# Patient Record
Sex: FEMALE | Race: WHITE | NOT HISPANIC OR LATINO | Employment: OTHER | URBAN - METROPOLITAN AREA
[De-identification: names, ages, dates, MRNs, and addresses within clinical notes are randomized per-mention and may not be internally consistent; named-entity substitution may affect disease eponyms.]

---

## 2017-01-04 ENCOUNTER — LAB REQUISITION (OUTPATIENT)
Dept: LAB | Facility: HOSPITAL | Age: 67
End: 2017-01-04

## 2017-01-04 DIAGNOSIS — Z00.00 ENCOUNTER FOR GENERAL ADULT MEDICAL EXAMINATION WITHOUT ABNORMAL FINDINGS: ICD-10-CM

## 2017-01-04 LAB
BACTERIA UR QL AUTO: ABNORMAL /HPF
BILIRUB UR QL STRIP: NEGATIVE
CLARITY UR: ABNORMAL
COLOR UR: YELLOW
GLUCOSE UR STRIP-MCNC: NEGATIVE MG/DL
HGB UR QL STRIP.AUTO: ABNORMAL
HYALINE CASTS UR QL AUTO: ABNORMAL /LPF
KETONES UR QL STRIP: NEGATIVE
LEUKOCYTE ESTERASE UR QL STRIP.AUTO: ABNORMAL
NITRITE UR QL STRIP: NEGATIVE
PH UR STRIP.AUTO: 5.5 [PH] (ref 5–8)
PROT UR QL STRIP: ABNORMAL
RBC # UR: ABNORMAL /HPF
REF LAB TEST METHOD: ABNORMAL
SP GR UR STRIP: 1.01 (ref 1–1.03)
SQUAMOUS #/AREA URNS HPF: ABNORMAL /HPF
UROBILINOGEN UR QL STRIP: ABNORMAL
WBC UR QL AUTO: ABNORMAL /HPF

## 2017-01-04 PROCEDURE — 87186 SC STD MICRODIL/AGAR DIL: CPT | Performed by: OBSTETRICS & GYNECOLOGY

## 2017-01-04 PROCEDURE — 87077 CULTURE AEROBIC IDENTIFY: CPT | Performed by: OBSTETRICS & GYNECOLOGY

## 2017-01-04 PROCEDURE — 81001 URINALYSIS AUTO W/SCOPE: CPT | Performed by: OBSTETRICS & GYNECOLOGY

## 2017-01-04 PROCEDURE — 87086 URINE CULTURE/COLONY COUNT: CPT | Performed by: OBSTETRICS & GYNECOLOGY

## 2017-01-04 PROCEDURE — 87147 CULTURE TYPE IMMUNOLOGIC: CPT | Performed by: OBSTETRICS & GYNECOLOGY

## 2017-01-07 LAB — BACTERIA SPEC AEROBE CULT: ABNORMAL

## 2018-02-06 ENCOUNTER — TRANSCRIBE ORDERS (OUTPATIENT)
Dept: ADMINISTRATIVE | Facility: HOSPITAL | Age: 68
End: 2018-02-06

## 2018-02-06 DIAGNOSIS — Z12.31 VISIT FOR SCREENING MAMMOGRAM: Primary | ICD-10-CM

## 2018-04-11 ENCOUNTER — HOSPITAL ENCOUNTER (OUTPATIENT)
Dept: MAMMOGRAPHY | Facility: HOSPITAL | Age: 68
Discharge: HOME | End: 2018-04-11
Attending: OBSTETRICS & GYNECOLOGY | Admitting: OBSTETRICS & GYNECOLOGY

## 2018-04-11 DIAGNOSIS — Z12.31 VISIT FOR SCREENING MAMMOGRAM: ICD-10-CM

## 2018-04-11 PROCEDURE — 77063 BREAST TOMOSYNTHESIS BI: CPT

## 2018-04-11 PROCEDURE — 77063 BREAST TOMOSYNTHESIS BI: CPT | Performed by: RADIOLOGY

## 2018-04-11 PROCEDURE — 77067 SCR MAMMO BI INCL CAD: CPT | Performed by: RADIOLOGY

## 2018-04-11 PROCEDURE — 77067 SCR MAMMO BI INCL CAD: CPT

## 2021-01-29 ENCOUNTER — IMMUNIZATION (OUTPATIENT)
Dept: VACCINE CLINIC | Facility: HOSPITAL | Age: 71
End: 2021-01-29

## 2021-01-29 PROCEDURE — 91300 HC SARSCOV02 VAC 30MCG/0.3ML IM: CPT | Performed by: INTERNAL MEDICINE

## 2021-01-29 PROCEDURE — 0001A: CPT | Performed by: INTERNAL MEDICINE

## 2021-02-19 ENCOUNTER — IMMUNIZATION (OUTPATIENT)
Dept: VACCINE CLINIC | Facility: HOSPITAL | Age: 71
End: 2021-02-19

## 2021-02-19 PROCEDURE — 0002A: CPT | Performed by: INTERNAL MEDICINE

## 2021-02-19 PROCEDURE — 91300 HC SARSCOV02 VAC 30MCG/0.3ML IM: CPT | Performed by: INTERNAL MEDICINE

## 2022-10-06 ENCOUNTER — HOSPITAL ENCOUNTER (INPATIENT)
Age: 72
LOS: 7 days | Discharge: HOME | DRG: 871 | End: 2022-10-13
Attending: EMERGENCY MEDICINE | Admitting: HOSPITALIST
Payer: MEDICARE

## 2022-10-06 ENCOUNTER — APPOINTMENT (OUTPATIENT)
Dept: GENERAL RADIOLOGY | Age: 72
DRG: 871 | End: 2022-10-06
Payer: MEDICARE

## 2022-10-06 DIAGNOSIS — E87.5 HYPERKALEMIA: ICD-10-CM

## 2022-10-06 DIAGNOSIS — R65.20 SEVERE SEPSIS (HCC): ICD-10-CM

## 2022-10-06 DIAGNOSIS — J18.9 COMMUNITY ACQUIRED PNEUMONIA OF RIGHT LOWER LOBE OF LUNG: ICD-10-CM

## 2022-10-06 DIAGNOSIS — J96.02 ACUTE RESPIRATORY FAILURE WITH HYPOXIA AND HYPERCAPNIA (HCC): Primary | ICD-10-CM

## 2022-10-06 DIAGNOSIS — A41.9 SEVERE SEPSIS (HCC): ICD-10-CM

## 2022-10-06 DIAGNOSIS — J96.01 ACUTE RESPIRATORY FAILURE WITH HYPOXIA AND HYPERCAPNIA (HCC): Primary | ICD-10-CM

## 2022-10-06 PROBLEM — J96.90 RESPIRATORY FAILURE (HCC): Status: ACTIVE | Noted: 2022-10-06

## 2022-10-06 LAB
ALBUMIN SERPL-MCNC: 3.5 G/DL (ref 3.5–5.2)
ALLENS TEST: ABNORMAL
ALP BLD-CCNC: 96 U/L (ref 35–104)
ALT SERPL-CCNC: 17 U/L (ref 5–33)
ANION GAP SERPL CALCULATED.3IONS-SCNC: 11 MMOL/L (ref 7–19)
AST SERPL-CCNC: 30 U/L (ref 5–32)
BASE EXCESS ARTERIAL: -11.5 MMOL/L (ref -2–2)
BASOPHILS ABSOLUTE: 0 K/UL (ref 0–0.2)
BASOPHILS RELATIVE PERCENT: 0.2 % (ref 0–1)
BILIRUB SERPL-MCNC: 0.4 MG/DL (ref 0.2–1.2)
BUN BLDV-MCNC: 62 MG/DL (ref 8–23)
CALCIUM SERPL-MCNC: 8.8 MG/DL (ref 8.8–10.2)
CARBOXYHEMOGLOBIN ARTERIAL: 1.2 % (ref 0–5)
CHLORIDE BLD-SCNC: 110 MMOL/L (ref 98–111)
CO2: 19 MMOL/L (ref 22–29)
CREAT SERPL-MCNC: 2.5 MG/DL (ref 0.5–0.9)
EOSINOPHILS ABSOLUTE: 0 K/UL (ref 0–0.6)
EOSINOPHILS RELATIVE PERCENT: 0.2 % (ref 0–5)
GFR AFRICAN AMERICAN: 23
GFR NON-AFRICAN AMERICAN: 19
GLUCOSE BLD-MCNC: 172 MG/DL (ref 70–99)
GLUCOSE BLD-MCNC: 216 MG/DL (ref 74–109)
HCO3 ARTERIAL: 16 MMOL/L (ref 22–26)
HCT VFR BLD CALC: 44 % (ref 37–47)
HEMOGLOBIN, ART, EXTENDED: 13.9 G/DL (ref 12–16)
HEMOGLOBIN: 13.8 G/DL (ref 12–16)
IMMATURE GRANULOCYTES #: 0 K/UL
LACTIC ACID: 2.2 MMOL/L (ref 0.5–1.9)
LYMPHOCYTES ABSOLUTE: 0.5 K/UL (ref 1.1–4.5)
LYMPHOCYTES RELATIVE PERCENT: 8.1 % (ref 20–40)
MCH RBC QN AUTO: 32.7 PG (ref 27–31)
MCHC RBC AUTO-ENTMCNC: 31.4 G/DL (ref 33–37)
MCV RBC AUTO: 104.3 FL (ref 81–99)
METHEMOGLOBIN ARTERIAL: 0.8 %
MONOCYTES ABSOLUTE: 0.1 K/UL (ref 0–0.9)
MONOCYTES RELATIVE PERCENT: 1.7 % (ref 0–10)
NEUTROPHILS ABSOLUTE: 6 K/UL (ref 1.5–7.5)
NEUTROPHILS RELATIVE PERCENT: 89.6 % (ref 50–65)
O2 CONTENT ARTERIAL: 15.9 ML/DL
O2 DELIVERY DEVICE: ABNORMAL
O2 SAT, ARTERIAL: 81.4 %
O2 THERAPY: ABNORMAL
OXYGEN FLOW: 5
PCO2 ARTERIAL: 41 MMHG (ref 35–45)
PDW BLD-RTO: 14.8 % (ref 11.5–14.5)
PERFORMED ON: ABNORMAL
PH ARTERIAL: 7.2 (ref 7.35–7.45)
PLATELET # BLD: 160 K/UL (ref 130–400)
PMV BLD AUTO: 9.8 FL (ref 9.4–12.3)
PO2 ARTERIAL: 52 MMHG (ref 80–100)
POTASSIUM SERPL-SCNC: 6.1 MMOL/L (ref 3.5–5)
POTASSIUM, WHOLE BLOOD: 5.5
PRO-BNP: 338 PG/ML (ref 0–900)
RBC # BLD: 4.22 M/UL (ref 4.2–5.4)
SAMPLE SOURCE: ABNORMAL
SARS-COV-2, NAAT: NOT DETECTED
SODIUM BLD-SCNC: 140 MMOL/L (ref 136–145)
TOTAL PROTEIN: 5.9 G/DL (ref 6.6–8.7)
TROPONIN: <0.01 NG/ML (ref 0–0.03)
WBC # BLD: 6.7 K/UL (ref 4.8–10.8)

## 2022-10-06 PROCEDURE — 85025 COMPLETE CBC W/AUTO DIFF WBC: CPT

## 2022-10-06 PROCEDURE — 84145 PROCALCITONIN (PCT): CPT

## 2022-10-06 PROCEDURE — 83735 ASSAY OF MAGNESIUM: CPT

## 2022-10-06 PROCEDURE — 02HV33Z INSERTION OF INFUSION DEVICE INTO SUPERIOR VENA CAVA, PERCUTANEOUS APPROACH: ICD-10-PCS | Performed by: HOSPITALIST

## 2022-10-06 PROCEDURE — 83605 ASSAY OF LACTIC ACID: CPT

## 2022-10-06 PROCEDURE — 94640 AIRWAY INHALATION TREATMENT: CPT

## 2022-10-06 PROCEDURE — 84484 ASSAY OF TROPONIN QUANT: CPT

## 2022-10-06 PROCEDURE — 71045 X-RAY EXAM CHEST 1 VIEW: CPT

## 2022-10-06 PROCEDURE — 36415 COLL VENOUS BLD VENIPUNCTURE: CPT

## 2022-10-06 PROCEDURE — 99285 EMERGENCY DEPT VISIT HI MDM: CPT

## 2022-10-06 PROCEDURE — 80053 COMPREHEN METABOLIC PANEL: CPT

## 2022-10-06 PROCEDURE — 2000000000 HC ICU R&B

## 2022-10-06 PROCEDURE — 93005 ELECTROCARDIOGRAM TRACING: CPT | Performed by: EMERGENCY MEDICINE

## 2022-10-06 PROCEDURE — 6370000000 HC RX 637 (ALT 250 FOR IP): Performed by: EMERGENCY MEDICINE

## 2022-10-06 PROCEDURE — 82306 VITAMIN D 25 HYDROXY: CPT

## 2022-10-06 PROCEDURE — 84443 ASSAY THYROID STIM HORMONE: CPT

## 2022-10-06 PROCEDURE — 83880 ASSAY OF NATRIURETIC PEPTIDE: CPT

## 2022-10-06 PROCEDURE — 2580000003 HC RX 258: Performed by: EMERGENCY MEDICINE

## 2022-10-06 PROCEDURE — 36600 WITHDRAWAL OF ARTERIAL BLOOD: CPT

## 2022-10-06 PROCEDURE — 87635 SARS-COV-2 COVID-19 AMP PRB: CPT

## 2022-10-06 RX ORDER — INSULIN GLARGINE 100 [IU]/ML
16 INJECTION, SOLUTION SUBCUTANEOUS NIGHTLY
COMMUNITY

## 2022-10-06 RX ORDER — DEXTROSE MONOHYDRATE 100 MG/ML
INJECTION, SOLUTION INTRAVENOUS CONTINUOUS PRN
Status: DISCONTINUED | OUTPATIENT
Start: 2022-10-06 | End: 2022-10-13 | Stop reason: HOSPADM

## 2022-10-06 RX ORDER — ACETAMINOPHEN 325 MG/1
650 TABLET ORAL EVERY 6 HOURS PRN
Status: DISCONTINUED | OUTPATIENT
Start: 2022-10-06 | End: 2022-10-13 | Stop reason: HOSPADM

## 2022-10-06 RX ORDER — LEVOFLOXACIN 5 MG/ML
750 INJECTION, SOLUTION INTRAVENOUS ONCE
Status: COMPLETED | OUTPATIENT
Start: 2022-10-06 | End: 2022-10-07

## 2022-10-06 RX ORDER — SODIUM CHLORIDE 0.9 % (FLUSH) 0.9 %
10 SYRINGE (ML) INJECTION PRN
Status: DISCONTINUED | OUTPATIENT
Start: 2022-10-06 | End: 2022-10-13 | Stop reason: HOSPADM

## 2022-10-06 RX ORDER — SODIUM CHLORIDE 9 MG/ML
INJECTION, SOLUTION INTRAVENOUS PRN
Status: DISCONTINUED | OUTPATIENT
Start: 2022-10-06 | End: 2022-10-13 | Stop reason: HOSPADM

## 2022-10-06 RX ORDER — ONDANSETRON 4 MG/1
4 TABLET, ORALLY DISINTEGRATING ORAL EVERY 8 HOURS PRN
Status: DISCONTINUED | OUTPATIENT
Start: 2022-10-06 | End: 2022-10-13 | Stop reason: HOSPADM

## 2022-10-06 RX ORDER — ASPIRIN 81 MG/1
81 TABLET, CHEWABLE ORAL DAILY
COMMUNITY

## 2022-10-06 RX ORDER — ACETAMINOPHEN 650 MG/1
650 SUPPOSITORY RECTAL EVERY 6 HOURS PRN
Status: DISCONTINUED | OUTPATIENT
Start: 2022-10-06 | End: 2022-10-13 | Stop reason: HOSPADM

## 2022-10-06 RX ORDER — SODIUM POLYSTYRENE SULFONATE 15 G/60ML
15 SUSPENSION ORAL; RECTAL ONCE
Status: DISCONTINUED | OUTPATIENT
Start: 2022-10-06 | End: 2022-10-07

## 2022-10-06 RX ORDER — GUAIFENESIN 600 MG/1
600 TABLET, EXTENDED RELEASE ORAL 2 TIMES DAILY
Status: DISCONTINUED | OUTPATIENT
Start: 2022-10-07 | End: 2022-10-13 | Stop reason: HOSPADM

## 2022-10-06 RX ORDER — ATORVASTATIN CALCIUM 20 MG/1
20 TABLET, FILM COATED ORAL DAILY
COMMUNITY

## 2022-10-06 RX ORDER — ONDANSETRON 2 MG/ML
4 INJECTION INTRAMUSCULAR; INTRAVENOUS EVERY 6 HOURS PRN
Status: DISCONTINUED | OUTPATIENT
Start: 2022-10-06 | End: 2022-10-13 | Stop reason: HOSPADM

## 2022-10-06 RX ORDER — INSULIN ASPART 100 [IU]/ML
INJECTION, SOLUTION INTRAVENOUS; SUBCUTANEOUS 3 TIMES DAILY
COMMUNITY

## 2022-10-06 RX ORDER — IPRATROPIUM BROMIDE AND ALBUTEROL SULFATE 2.5; .5 MG/3ML; MG/3ML
1 SOLUTION RESPIRATORY (INHALATION)
Status: DISCONTINUED | OUTPATIENT
Start: 2022-10-07 | End: 2022-10-13 | Stop reason: HOSPADM

## 2022-10-06 RX ORDER — IPRATROPIUM BROMIDE AND ALBUTEROL SULFATE 2.5; .5 MG/3ML; MG/3ML
1 SOLUTION RESPIRATORY (INHALATION) ONCE
Status: COMPLETED | OUTPATIENT
Start: 2022-10-06 | End: 2022-10-06

## 2022-10-06 RX ORDER — POLYETHYLENE GLYCOL 3350 17 G/17G
17 POWDER, FOR SOLUTION ORAL DAILY PRN
Status: DISCONTINUED | OUTPATIENT
Start: 2022-10-06 | End: 2022-10-13 | Stop reason: HOSPADM

## 2022-10-06 RX ORDER — SODIUM CHLORIDE 0.9 % (FLUSH) 0.9 %
5-40 SYRINGE (ML) INJECTION EVERY 12 HOURS SCHEDULED
Status: DISCONTINUED | OUTPATIENT
Start: 2022-10-06 | End: 2022-10-13 | Stop reason: HOSPADM

## 2022-10-06 RX ORDER — ACETYLCYSTEINE 200 MG/ML
600 SOLUTION ORAL; RESPIRATORY (INHALATION) 2 TIMES DAILY
Status: DISCONTINUED | OUTPATIENT
Start: 2022-10-07 | End: 2022-10-13 | Stop reason: HOSPADM

## 2022-10-06 RX ORDER — ENOXAPARIN SODIUM 100 MG/ML
30 INJECTION SUBCUTANEOUS DAILY
Status: DISCONTINUED | OUTPATIENT
Start: 2022-10-07 | End: 2022-10-13 | Stop reason: HOSPADM

## 2022-10-06 RX ORDER — SODIUM CHLORIDE, SODIUM LACTATE, POTASSIUM CHLORIDE, AND CALCIUM CHLORIDE .6; .31; .03; .02 G/100ML; G/100ML; G/100ML; G/100ML
30 INJECTION, SOLUTION INTRAVENOUS ONCE
Status: COMPLETED | OUTPATIENT
Start: 2022-10-06 | End: 2022-10-07

## 2022-10-06 RX ADMIN — IPRATROPIUM BROMIDE AND ALBUTEROL SULFATE 1 AMPULE: 2.5; .5 SOLUTION RESPIRATORY (INHALATION) at 23:16

## 2022-10-06 RX ADMIN — SODIUM CHLORIDE, POTASSIUM CHLORIDE, SODIUM LACTATE AND CALCIUM CHLORIDE 1000 ML: 600; 310; 30; 20 INJECTION, SOLUTION INTRAVENOUS at 23:49

## 2022-10-07 ENCOUNTER — APPOINTMENT (OUTPATIENT)
Dept: GENERAL RADIOLOGY | Age: 72
DRG: 871 | End: 2022-10-07
Payer: MEDICARE

## 2022-10-07 ENCOUNTER — APPOINTMENT (OUTPATIENT)
Dept: NUCLEAR MEDICINE | Age: 72
DRG: 871 | End: 2022-10-07
Payer: MEDICARE

## 2022-10-07 LAB
ADENOVIRUS BY PCR: NOT DETECTED
ALLENS TEST: ABNORMAL
ANION GAP SERPL CALCULATED.3IONS-SCNC: 13 MMOL/L (ref 7–19)
BACTERIA: ABNORMAL /HPF
BASE EXCESS ARTERIAL: -10 MMOL/L (ref -2–2)
BILIRUBIN URINE: NEGATIVE
BLOOD, URINE: ABNORMAL
BORDETELLA PARAPERTUSSIS BY PCR: NOT DETECTED
BORDETELLA PERTUSSIS BY PCR: NOT DETECTED
BUN BLDV-MCNC: 62 MG/DL (ref 8–23)
CALCIUM SERPL-MCNC: 8.1 MG/DL (ref 8.8–10.2)
CARBOXYHEMOGLOBIN ARTERIAL: 1.5 % (ref 0–5)
CHLAMYDOPHILIA PNEUMONIAE BY PCR: NOT DETECTED
CHLORIDE BLD-SCNC: 111 MMOL/L (ref 98–111)
CLARITY: ABNORMAL
CO2: 16 MMOL/L (ref 22–29)
COLOR: YELLOW
CORONAVIRUS 229E BY PCR: NOT DETECTED
CORONAVIRUS HKU1 BY PCR: NOT DETECTED
CORONAVIRUS NL63 BY PCR: NOT DETECTED
CORONAVIRUS OC43 BY PCR: NOT DETECTED
CREAT SERPL-MCNC: 2.7 MG/DL (ref 0.5–0.9)
CRYSTALS, UA: ABNORMAL /HPF
D DIMER: 2.36 UG/ML FEU (ref 0–0.48)
EKG P AXIS: 69 DEGREES
EKG P-R INTERVAL: 126 MS
EKG Q-T INTERVAL: 336 MS
EKG QRS DURATION: 82 MS
EKG QTC CALCULATION (BAZETT): 416 MS
EKG T AXIS: 71 DEGREES
EPITHELIAL CELLS, UA: 2 /HPF (ref 0–5)
FERRITIN: 90.3 NG/ML (ref 13–150)
FOLATE: >20 NG/ML (ref 4.8–37.3)
GFR AFRICAN AMERICAN: 21
GFR NON-AFRICAN AMERICAN: 17
GLUCOSE BLD-MCNC: 199 MG/DL (ref 74–109)
GLUCOSE BLD-MCNC: 236 MG/DL (ref 70–99)
GLUCOSE BLD-MCNC: 288 MG/DL (ref 70–99)
GLUCOSE BLD-MCNC: 301 MG/DL (ref 70–99)
GLUCOSE BLD-MCNC: 311 MG/DL (ref 70–99)
GLUCOSE BLD-MCNC: 314 MG/DL (ref 70–99)
GLUCOSE URINE: NEGATIVE MG/DL
HCO3 ARTERIAL: 15.3 MMOL/L (ref 22–26)
HCT VFR BLD CALC: 33.7 % (ref 37–47)
HEMOGLOBIN, ART, EXTENDED: 10.4 G/DL (ref 12–16)
HEMOGLOBIN: 10.9 G/DL (ref 12–16)
HUMAN METAPNEUMOVIRUS BY PCR: NOT DETECTED
HUMAN RHINOVIRUS/ENTEROVIRUS BY PCR: NOT DETECTED
HYALINE CASTS: 6 /HPF (ref 0–8)
INFLUENZA A BY PCR: NOT DETECTED
INFLUENZA B BY PCR: NOT DETECTED
IRON SATURATION: 35 % (ref 14–50)
IRON: 79 UG/DL (ref 37–145)
KETONES, URINE: NEGATIVE MG/DL
LACTIC ACID: 1.2 MMOL/L (ref 0.5–1.9)
LACTIC ACID: 2.7 MMOL/L (ref 0.5–1.9)
LEUKOCYTE ESTERASE, URINE: ABNORMAL
LV EF: 73 %
LVEF MODALITY: NORMAL
MAGNESIUM: 2 MG/DL (ref 1.6–2.4)
MCH RBC QN AUTO: 33.5 PG (ref 27–31)
MCHC RBC AUTO-ENTMCNC: 32.3 G/DL (ref 33–37)
MCV RBC AUTO: 103.7 FL (ref 81–99)
METHEMOGLOBIN ARTERIAL: 0.7 %
MYCOPLASMA PNEUMONIAE BY PCR: NOT DETECTED
NITRITE, URINE: NEGATIVE
O2 CONTENT ARTERIAL: 13.3 ML/DL
O2 DELIVERY DEVICE: ABNORMAL
O2 SAT, ARTERIAL: 90.5 %
O2 THERAPY: ABNORMAL
OXYGEN FLOW: 7
PARAINFLUENZA VIRUS 1 BY PCR: NOT DETECTED
PARAINFLUENZA VIRUS 2 BY PCR: NOT DETECTED
PARAINFLUENZA VIRUS 3 BY PCR: NOT DETECTED
PARAINFLUENZA VIRUS 4 BY PCR: NOT DETECTED
PCO2 ARTERIAL: 31 MMHG (ref 35–45)
PDW BLD-RTO: 14.7 % (ref 11.5–14.5)
PERFORMED ON: ABNORMAL
PH ARTERIAL: 7.3 (ref 7.35–7.45)
PH UA: 8 (ref 5–8)
PLATELET # BLD: 126 K/UL (ref 130–400)
PMV BLD AUTO: 10.4 FL (ref 9.4–12.3)
PO2 ARTERIAL: 58 MMHG (ref 80–100)
POTASSIUM SERPL-SCNC: 5.1 MMOL/L (ref 3.5–5)
POTASSIUM, WHOLE BLOOD: 5.7
PROCALCITONIN: 0.48 NG/ML (ref 0–0.09)
PROTEIN UA: 100 MG/DL
RBC # BLD: 3.25 M/UL (ref 4.2–5.4)
RBC UA: 11 /HPF (ref 0–4)
RESPIRATORY SYNCYTIAL VIRUS BY PCR: NOT DETECTED
SAMPLE SOURCE: ABNORMAL
SARS-COV-2, PCR: NOT DETECTED
SODIUM BLD-SCNC: 140 MMOL/L (ref 136–145)
SPECIFIC GRAVITY UA: 1.01 (ref 1–1.03)
TOTAL IRON BINDING CAPACITY: 226 UG/DL (ref 250–400)
TSH SERPL DL<=0.05 MIU/L-ACNC: 1.84 UIU/ML (ref 0.27–4.2)
UROBILINOGEN, URINE: 0.2 E.U./DL
VITAMIN B-12: 1234 PG/ML (ref 211–946)
VITAMIN D 25-HYDROXY: 51.4 NG/ML
WBC # BLD: 10.1 K/UL (ref 4.8–10.8)
WBC UA: >900 /HPF (ref 0–5)

## 2022-10-07 PROCEDURE — 6360000002 HC RX W HCPCS: Performed by: HOSPITALIST

## 2022-10-07 PROCEDURE — 81001 URINALYSIS AUTO W/SCOPE: CPT

## 2022-10-07 PROCEDURE — 87186 SC STD MICRODIL/AGAR DIL: CPT

## 2022-10-07 PROCEDURE — 87150 DNA/RNA AMPLIFIED PROBE: CPT

## 2022-10-07 PROCEDURE — 82803 BLOOD GASES ANY COMBINATION: CPT

## 2022-10-07 PROCEDURE — C8929 TTE W OR WO FOL WCON,DOPPLER: HCPCS

## 2022-10-07 PROCEDURE — 3430000000 HC RX DIAGNOSTIC RADIOPHARMACEUTICAL: Performed by: HOSPITALIST

## 2022-10-07 PROCEDURE — 6360000004 HC RX CONTRAST MEDICATION: Performed by: INTERNAL MEDICINE

## 2022-10-07 PROCEDURE — 2580000003 HC RX 258: Performed by: HOSPITALIST

## 2022-10-07 PROCEDURE — 94640 AIRWAY INHALATION TREATMENT: CPT

## 2022-10-07 PROCEDURE — 36415 COLL VENOUS BLD VENIPUNCTURE: CPT

## 2022-10-07 PROCEDURE — 93010 ELECTROCARDIOGRAM REPORT: CPT | Performed by: INTERNAL MEDICINE

## 2022-10-07 PROCEDURE — 36556 INSERT NON-TUNNEL CV CATH: CPT

## 2022-10-07 PROCEDURE — A9540 TC99M MAA: HCPCS | Performed by: HOSPITALIST

## 2022-10-07 PROCEDURE — 87040 BLOOD CULTURE FOR BACTERIA: CPT

## 2022-10-07 PROCEDURE — 82746 ASSAY OF FOLIC ACID SERUM: CPT

## 2022-10-07 PROCEDURE — 2700000000 HC OXYGEN THERAPY PER DAY

## 2022-10-07 PROCEDURE — 83550 IRON BINDING TEST: CPT

## 2022-10-07 PROCEDURE — 80048 BASIC METABOLIC PNL TOTAL CA: CPT

## 2022-10-07 PROCEDURE — 2000000000 HC ICU R&B

## 2022-10-07 PROCEDURE — 78580 LUNG PERFUSION IMAGING: CPT

## 2022-10-07 PROCEDURE — 71045 X-RAY EXAM CHEST 1 VIEW: CPT

## 2022-10-07 PROCEDURE — 6360000002 HC RX W HCPCS: Performed by: EMERGENCY MEDICINE

## 2022-10-07 PROCEDURE — 0202U NFCT DS 22 TRGT SARS-COV-2: CPT

## 2022-10-07 PROCEDURE — 2500000003 HC RX 250 WO HCPCS: Performed by: HOSPITALIST

## 2022-10-07 PROCEDURE — 6370000000 HC RX 637 (ALT 250 FOR IP): Performed by: EMERGENCY MEDICINE

## 2022-10-07 PROCEDURE — 2580000003 HC RX 258: Performed by: EMERGENCY MEDICINE

## 2022-10-07 PROCEDURE — 51702 INSERT TEMP BLADDER CATH: CPT

## 2022-10-07 PROCEDURE — 92610 EVALUATE SWALLOWING FUNCTION: CPT

## 2022-10-07 PROCEDURE — 85027 COMPLETE CBC AUTOMATED: CPT

## 2022-10-07 PROCEDURE — 92522 EVALUATE SPEECH PRODUCTION: CPT

## 2022-10-07 PROCEDURE — 85379 FIBRIN DEGRADATION QUANT: CPT

## 2022-10-07 PROCEDURE — 6370000000 HC RX 637 (ALT 250 FOR IP): Performed by: HOSPITALIST

## 2022-10-07 PROCEDURE — 83605 ASSAY OF LACTIC ACID: CPT

## 2022-10-07 PROCEDURE — 6360000002 HC RX W HCPCS: Performed by: INTERNAL MEDICINE

## 2022-10-07 PROCEDURE — 2580000003 HC RX 258: Performed by: INTERNAL MEDICINE

## 2022-10-07 PROCEDURE — 87086 URINE CULTURE/COLONY COUNT: CPT

## 2022-10-07 PROCEDURE — 82607 VITAMIN B-12: CPT

## 2022-10-07 PROCEDURE — 83540 ASSAY OF IRON: CPT

## 2022-10-07 PROCEDURE — 82728 ASSAY OF FERRITIN: CPT

## 2022-10-07 PROCEDURE — 74018 RADEX ABDOMEN 1 VIEW: CPT

## 2022-10-07 PROCEDURE — 82947 ASSAY GLUCOSE BLOOD QUANT: CPT

## 2022-10-07 RX ORDER — SODIUM BICARBONATE 650 MG/1
650 TABLET ORAL 4 TIMES DAILY
Status: DISCONTINUED | OUTPATIENT
Start: 2022-10-07 | End: 2022-10-07

## 2022-10-07 RX ORDER — SODIUM CHLORIDE 9 MG/ML
INJECTION, SOLUTION INTRAVENOUS CONTINUOUS
Status: DISCONTINUED | OUTPATIENT
Start: 2022-10-07 | End: 2022-10-07

## 2022-10-07 RX ORDER — INSULIN LISPRO 100 [IU]/ML
0-8 INJECTION, SOLUTION INTRAVENOUS; SUBCUTANEOUS
Status: DISCONTINUED | OUTPATIENT
Start: 2022-10-07 | End: 2022-10-13 | Stop reason: HOSPADM

## 2022-10-07 RX ORDER — INSULIN LISPRO 100 [IU]/ML
0-4 INJECTION, SOLUTION INTRAVENOUS; SUBCUTANEOUS NIGHTLY
Status: DISCONTINUED | OUTPATIENT
Start: 2022-10-07 | End: 2022-10-13 | Stop reason: HOSPADM

## 2022-10-07 RX ORDER — POLYETHYLENE GLYCOL 3350 17 G/17G
17 POWDER, FOR SOLUTION ORAL DAILY
Status: DISCONTINUED | OUTPATIENT
Start: 2022-10-07 | End: 2022-10-13 | Stop reason: HOSPADM

## 2022-10-07 RX ORDER — NOREPINEPHRINE BIT/0.9 % NACL 16MG/250ML
1-100 INFUSION BOTTLE (ML) INTRAVENOUS CONTINUOUS
Status: DISCONTINUED | OUTPATIENT
Start: 2022-10-07 | End: 2022-10-09

## 2022-10-07 RX ORDER — SENNA AND DOCUSATE SODIUM 50; 8.6 MG/1; MG/1
2 TABLET, FILM COATED ORAL DAILY
Status: DISCONTINUED | OUTPATIENT
Start: 2022-10-07 | End: 2022-10-13 | Stop reason: HOSPADM

## 2022-10-07 RX ORDER — LEVOFLOXACIN 5 MG/ML
750 INJECTION, SOLUTION INTRAVENOUS
Status: DISCONTINUED | OUTPATIENT
Start: 2022-10-09 | End: 2022-10-07

## 2022-10-07 RX ORDER — INSULIN GLARGINE 100 [IU]/ML
10 INJECTION, SOLUTION SUBCUTANEOUS 2 TIMES DAILY
Status: DISCONTINUED | OUTPATIENT
Start: 2022-10-07 | End: 2022-10-13 | Stop reason: HOSPADM

## 2022-10-07 RX ORDER — 0.9 % SODIUM CHLORIDE 0.9 %
30 INTRAVENOUS SOLUTION INTRAVENOUS ONCE
Status: COMPLETED | OUTPATIENT
Start: 2022-10-07 | End: 2022-10-07

## 2022-10-07 RX ORDER — SODIUM CHLORIDE 9 MG/ML
INJECTION, SOLUTION INTRAVENOUS CONTINUOUS
Status: DISCONTINUED | OUTPATIENT
Start: 2022-10-07 | End: 2022-10-10

## 2022-10-07 RX ADMIN — Medication 5 MCG/MIN: at 07:40

## 2022-10-07 RX ADMIN — INSULIN HUMAN 10 UNITS: 100 INJECTION, SOLUTION PARENTERAL at 01:48

## 2022-10-07 RX ADMIN — SODIUM CHLORIDE, PRESERVATIVE FREE 10 ML: 5 INJECTION INTRAVENOUS at 20:37

## 2022-10-07 RX ADMIN — IPRATROPIUM BROMIDE AND ALBUTEROL SULFATE 1 AMPULE: 2.5; .5 SOLUTION RESPIRATORY (INHALATION) at 06:37

## 2022-10-07 RX ADMIN — SODIUM CHLORIDE 2109 ML: 9 INJECTION, SOLUTION INTRAVENOUS at 03:33

## 2022-10-07 RX ADMIN — AZITHROMYCIN DIHYDRATE 500 MG: 500 INJECTION, POWDER, LYOPHILIZED, FOR SOLUTION INTRAVENOUS at 13:39

## 2022-10-07 RX ADMIN — Medication 4 MILLICURIE: at 12:15

## 2022-10-07 RX ADMIN — GUAIFENESIN 600 MG: 600 TABLET ORAL at 09:23

## 2022-10-07 RX ADMIN — GUAIFENESIN 600 MG: 600 TABLET ORAL at 03:25

## 2022-10-07 RX ADMIN — ENOXAPARIN SODIUM 30 MG: 100 INJECTION SUBCUTANEOUS at 09:23

## 2022-10-07 RX ADMIN — IPRATROPIUM BROMIDE AND ALBUTEROL SULFATE 1 AMPULE: 2.5; .5 SOLUTION RESPIRATORY (INHALATION) at 14:47

## 2022-10-07 RX ADMIN — SODIUM CHLORIDE: 9 INJECTION, SOLUTION INTRAVENOUS at 23:29

## 2022-10-07 RX ADMIN — SODIUM BICARBONATE 650 MG: 650 TABLET ORAL at 09:23

## 2022-10-07 RX ADMIN — LEVOFLOXACIN 750 MG: 5 INJECTION, SOLUTION INTRAVENOUS at 01:47

## 2022-10-07 RX ADMIN — IPRATROPIUM BROMIDE AND ALBUTEROL SULFATE 1 AMPULE: 2.5; .5 SOLUTION RESPIRATORY (INHALATION) at 10:32

## 2022-10-07 RX ADMIN — VANCOMYCIN HYDROCHLORIDE 1500 MG: 10 INJECTION, POWDER, LYOPHILIZED, FOR SOLUTION INTRAVENOUS at 06:27

## 2022-10-07 RX ADMIN — GUAIFENESIN 600 MG: 600 TABLET ORAL at 20:36

## 2022-10-07 RX ADMIN — PERFLUTREN 1.5 ML: 6.52 INJECTION, SUSPENSION INTRAVENOUS at 08:08

## 2022-10-07 RX ADMIN — SODIUM CHLORIDE: 9 INJECTION, SOLUTION INTRAVENOUS at 12:18

## 2022-10-07 RX ADMIN — SODIUM CHLORIDE: 9 INJECTION, SOLUTION INTRAVENOUS at 05:57

## 2022-10-07 RX ADMIN — INSULIN LISPRO 6 UNITS: 100 INJECTION, SOLUTION INTRAVENOUS; SUBCUTANEOUS at 17:43

## 2022-10-07 RX ADMIN — WATER 1000 MG: 1 INJECTION INTRAMUSCULAR; INTRAVENOUS; SUBCUTANEOUS at 13:39

## 2022-10-07 RX ADMIN — DEXTROSE MONOHYDRATE 250 ML: 10 INJECTION, SOLUTION INTRAVENOUS at 01:44

## 2022-10-07 RX ADMIN — INSULIN GLARGINE 10 UNITS: 100 INJECTION, SOLUTION SUBCUTANEOUS at 20:36

## 2022-10-07 RX ADMIN — INSULIN LISPRO 6 UNITS: 100 INJECTION, SOLUTION INTRAVENOUS; SUBCUTANEOUS at 13:45

## 2022-10-07 RX ADMIN — IPRATROPIUM BROMIDE AND ALBUTEROL SULFATE 1 AMPULE: 2.5; .5 SOLUTION RESPIRATORY (INHALATION) at 18:39

## 2022-10-07 ASSESSMENT — ENCOUNTER SYMPTOMS
NAUSEA: 1
ABDOMINAL PAIN: 0
RHINORRHEA: 0
SHORTNESS OF BREATH: 1
DIARRHEA: 0
SINUS PRESSURE: 0
VOMITING: 1
SORE THROAT: 0

## 2022-10-07 ASSESSMENT — PAIN SCALES - GENERAL
PAINLEVEL_OUTOF10: 0

## 2022-10-07 NOTE — PROGRESS NOTES
[facility] Pharmacokinetic Monitoring Service - Vancomycin     Jovita To is a 70 y.o. female starting on vancomycin therapy for Sepsis/Uti. Pharmacy consulted by Dr Kelly Monet for monitoring and adjustment. Target Concentration: Dosing based on anticipated concentration <15 mg/L due to renal impairment/insufficiency    Additional Antimicrobials: Levaquin    Pertinent Laboratory Values: Wt Readings from Last 1 Encounters:   10/07/22 155 lb 12.8 oz (70.7 kg)     Temp Readings from Last 1 Encounters:   10/07/22 98.9 °F (37.2 °C) (Temporal)     Estimated Creatinine Clearance: 18 mL/min (A) (based on SCr of 2.7 mg/dL (H)). Recent Labs     10/06/22  2255 10/07/22  0408   CREATININE 2.5* 2.7*   WBC 6.7 10.1     Procalcitonin: 10/06= 0.48    Pertinent Cultures:  No current cultures at this time  Culture Date Source Results        MRSA Nasal Swab: N/A. Non-respiratory infection.     Plan:  Concentration-guided dosing due to renal impairment/insufficiency  Start vancomycin 1500mg x1 dose  Renal labs as indicated   Vancomycin concentration ordered for 10/8 @ 0300   Pharmacy will continue to monitor patient and adjust therapy as indicated    Thank you for the consult,  Pérez Doyle, [address] [facility]  10/7/2022 5:55 AM

## 2022-10-07 NOTE — ED PROVIDER NOTES
[facility] 4 ONCOLOGY UNIT  eMERGENCY dEPARTMENT eNCOUnter      Pt Name: Cy Pimentel  MRN: [id number]  [place] 1950  Date of evaluation: 10/6/2022  Provider: Sami Antony MD    CHIEF COMPLAINT       Chief Complaint   Patient presents with    Shortness of Breath         HISTORY OF PRESENT ILLNESS   (Location/Symptom, Timing/Onset,Context/Setting, Quality, Duration, Modifying Factors, Severity)  Note limiting factors. Cy Pimentel is a 70 y.o. female who presents to the emergency department shortness of breath     HPI    The patient is a 70 male with a history of type 1 diabetes; chronic kidney disease stage IV not yet receiving dialysis; gallstones; hypertension; lactose intolerance; osteopenia; recent admission in April for sepsis and a UTI who presents with increasing shortness of breath nausea and vomiting over the course of 1 day. No documented fever. Comes in tachypneic and in respiratory distress satting in the 80s. Typically require oxygen. Had a bout of hypoglycemia earlier today which preceded her nausea and vomiting and increasing shortness of breath. NursingNotes were reviewed. REVIEW OF SYSTEMS    (2-9 systems for level 4, 10 or more for level 5)     Review of Systems   Constitutional:  Negative for chills, diaphoresis, fatigue and fever. HENT:  Negative for rhinorrhea, sinus pressure and sore throat. Eyes:  Negative for visual disturbance. Respiratory:  Positive for shortness of breath. Cardiovascular:  Negative for chest pain. Gastrointestinal:  Positive for nausea and vomiting. Negative for abdominal pain and diarrhea. Genitourinary:  Negative for difficulty urinating and dysuria. Musculoskeletal:  Negative for arthralgias and myalgias. Skin:  Negative for rash. Neurological:  Negative for weakness. Psychiatric/Behavioral:  Negative for confusion. All other systems reviewed and are negative.          PAST MEDICALHISTORY     Past Medical History:   Diagnosis Date Chronic kidney disease     stage 4 not receiving dialysis    Diabetes mellitus ([facility] [address]     type 1 dx 1963    Gallstones     Hypertension     Lactose intolerance     Osteopenia     UTI (urinary tract infection)     in ICU in April with UTI         SURGICAL HISTORY       Past Surgical History:   Procedure Laterality Date    CARPAL TUNNEL RELEASE Bilateral     CATARACT REMOVAL      FRACTURE SURGERY           CURRENT MEDICATIONS     Current Discharge Medication List        CONTINUE these medications which have NOT CHANGED    Details   insulin glargine (LANTUS) 100 UNIT/ML injection vial Inject 16 Units into the skin nightly      insulin aspart (NOVOLOG) 100 UNIT/ML injection vial Inject into the skin 3 times daily SS before each meal      !! aspirin 81 MG chewable tablet Take 81 mg by mouth daily      atorvastatin (LIPITOR) 20 MG tablet Take 20 mg by mouth daily      !! ASPIRIN 81 PO Take 81 mg by mouth daily       ! ! - Potential duplicate medications found. Please discuss with provider. ALLERGIES     Pcn [penicillins]    FAMILY HISTORY     History reviewed. No pertinent family history.        SOCIAL HISTORY       Social History     Socioeconomic History    Marital status:      Spouse name: None    Number of children: None    Years of education: None    Highest education level: None   Tobacco Use    Smoking status: Never    Smokeless tobacco: Never   Substance and Sexual Activity    Alcohol use: Never    Drug use: Never       SCREENINGS    [place] Coma Scale  Eye Opening: Spontaneous  Best Verbal Response: Oriented  Best Motor Response: Obeys commands  [place] Coma Scale Score: 15        PHYSICAL EXAM    (up to 7 for level 4, 8 or more for level 5)     ED Triage Vitals   BP Temp Temp src Heart Rate Resp SpO2 Height Weight   10/06/22 2248 10/06/22 2248 -- 10/06/22 2248 -- 10/06/22 2247 10/06/22 2248 10/06/22 2248   (!) 130/113 97.6 °F (36.4 °C)  (!) 108  (!) 73 % 5' 4\" (1.626 m) 155 lb (70.3 kg) Physical Exam  Vitals and nursing note reviewed. Constitutional:       Appearance: She is well-developed. HENT:      Head: Normocephalic and atraumatic. Eyes:      General:         Right eye: No discharge. Left eye: No discharge. Conjunctiva/sclera: Conjunctivae normal.      Pupils: Pupils are equal, round, and reactive to light. Cardiovascular:      Rate and Rhythm: Normal rate and regular rhythm. Heart sounds: Normal heart sounds. Pulmonary:      Effort: Pulmonary effort is normal. No respiratory distress. Breath sounds: Examination of the right-lower field reveals rales. Examination of the left-lower field reveals rales. Rales present. No wheezing. Abdominal:      General: Bowel sounds are normal.      Palpations: Abdomen is soft. There is no mass. Tenderness: There is no abdominal tenderness. There is no guarding or rebound. Musculoskeletal:         General: No tenderness. Normal range of motion. Cervical back: Normal range of motion and neck supple. Skin:     General: Skin is warm and dry. Capillary Refill: Capillary refill takes less than 2 seconds. Coloration: Skin is not pale. Neurological:      Mental Status: She is alert and oriented to person, place, and time. Psychiatric:         Behavior: Behavior normal.         Thought Content: Thought content normal.         Judgment: Judgment normal.       DIAGNOSTIC RESULTS     EKG: All EKG's areinterpreted by the Emergency Department Physician who either signs or Co-signs this chart in the absence of a cardiologist.    Sinus tachycardia with a rate of 106 left axis deviation abnormal R wave progression    RADIOLOGY:  Non-plain film images such as CT, Ultrasound and MRI are read by the radiologist. Plain radiographic images are visualized and preliminarily interpreted bythe emergency physician with the below findings:          US RENAL COMPLETE   Final Result   NORMAL RENAL SONOGRAM..    There is suspicion for possible dilated ureter. I would recommend noncontrast CT   of the abdomen and pelvis for further evaluation      XR ABDOMEN (KUB) (SINGLE AP VIEW)   Final Result   Non-specific gas pattern. Fecal stasis suggesting of constipation      XR CHEST PORTABLE   Final Result   Right central line in the upper SVCElectronically signed by Keyona Galo MD on 10-07-22 at 0224      XR CHEST PORTABLE   Final Result   Airspace opacities within the mid to lower lungs likely infectious. Electronically signed by Lisa Lomax MD on 10-06-22 at [address]    (Results Pending)   CT ABDOMEN PELVIS WO CONTRAST Additional Contrast? None    (Results Pending)           LABS:  Labs Reviewed   CULTURE, BLOOD 2 - Abnormal; Notable for the following components:       Result Value    Culture, Blood 2   (*)     Value: Gram stain Anaerobic bottle:    Bottle volume = 7 ml  Gram positive cocci in chains and/or pairs  resembling Streptococcus  Culture in progress  Please notify Physician      Organism Streptococcus alactolyticus (*)     All other components within normal limits    Narrative:     ORDER#: [id number]                          ORDERED BY: Aram Galeas  SOURCE: Blood CVC                          COLLECTED:  10/07/22 01:45  [facility]                      RECEIVED :  10/07/22 92:44  CALL  [facility] tel. ,  Microbiology results called to and read back by Vincent Escobar RN/ICU,  10/07/2022 16:00, by SPENSER   CULTURE, URINE - Abnormal; Notable for the following components:    Urine Culture, Routine   (*)     Value: >100,000 CFU/ml  Mixed skin nick present      Organism Enterococcus faecium (*)     All other components within normal limits    Narrative:     ORDER#: [id number]                          ORDERED BY: Aram Galeas  SOURCE: Urine Clean Catch                  COLLECTED:  10/07/22 00:18  [facility]                      RECEIVED :  10/07/22 01:04   CBC WITH AUTO DIFFERENTIAL - Abnormal; Notable for the following components:    .3 (*)     MCH 32.7 (*)     MCHC 31.4 (*)     RDW 14.8 (*)     Neutrophils % 89.6 (*)     Lymphocytes % 8.1 (*)     Lymphocytes Absolute 0.5 (*)     All other components within normal limits   COMPREHENSIVE METABOLIC PANEL - Abnormal; Notable for the following components:    Potassium 6.1 (*)     CO2 19 (*)     Glucose 216 (*)     BUN 62 (*)     Creatinine 2.5 (*)     GFR Non- 19 (*)     GFR  23 (*)     Total Protein 5.9 (*)     All other components within normal limits    Narrative:     CALL  [facility] tel. ,  Chemistry results called to and read back by LEIGHA Duval in ED, 10/06/2022  23:34, by MATTHEW   LACTIC ACID - Abnormal; Notable for the following components:    Lactic Acid 2.2 (*)     All other components within normal limits    Narrative:     [facility] tel. ,  Chemistry results called to and read back by Edith Ho in ED, 10/06/2022  23:33, by MATTHEW   BLOOD GAS, ARTERIAL - Abnormal; Notable for the following components:    pH, Arterial 7.200 (*)     pO2, Arterial 52.0 (*)     HCO3, Arterial 16.0 (*)     Base Excess, Arterial -11.5 (*)     O2 Sat, Arterial 81.4 (*)     All other components within normal limits    Narrative:     CALL  [facility]  [facility] tel. ,  dr Hero Manzanares, 10/06/2022 23:17, by Eladio Prasad   PROCALCITONIN - Abnormal; Notable for the following components:    Procalcitonin 0.48 (*)     All other components within normal limits   LACTIC ACID - Abnormal; Notable for the following components:    Lactic Acid 2.7 (*)     All other components within normal limits    Narrative:     CALL  [facility] tel. ,  Chemistry results called to and read back by Lin Phillips RN in ICU, 10/07/2022  04:57, by MATTHEW   BASIC METABOLIC PANEL - Abnormal; Notable for the following components:    Potassium 5.1 (*)     CO2 16 (*)     Glucose 199 (*)     BUN 62 (*)     Creatinine 2.7 (*)     GFR Non- 17 (*)     GFR  American 21 (*)     Calcium 8.1 (*)     All other components within normal limits   CBC - Abnormal; Notable for the following components:    RBC 3.25 (*)     Hemoglobin 10.9 (*)     Hematocrit 33.7 (*)     .7 (*)     MCH 33.5 (*)     MCHC 32.3 (*)     RDW 14.7 (*)     Platelets 770 (*)     All other components within normal limits   URINALYSIS WITH REFLEX TO CULTURE - Abnormal; Notable for the following components:    Clarity, UA TURBID (*)     Blood, Urine SMALL (*)     Protein,  (*)     Leukocyte Esterase, Urine LARGE (*)     All other components within normal limits   MICROSCOPIC URINALYSIS - Abnormal; Notable for the following components:    Bacteria, UA 3+ (*)     Crystals, UA NEG (*)     WBC, UA >900 (*)     RBC, UA 11 (*)     All other components within normal limits   D-DIMER, QUANTITATIVE - Abnormal; Notable for the following components:    D-Dimer, Quant 2.36 (*)     All other components within normal limits   IRON AND TIBC - Abnormal; Notable for the following components:    TIBC 226 (*)     All other components within normal limits   VITAMIN B12 - Abnormal; Notable for the following components:    Vitamin B-12 1234 (*)     All other components within normal limits   BLOOD GAS, ARTERIAL - Abnormal; Notable for the following components:    pH, Arterial 7.300 (*)     pCO2, Arterial 31.0 (*)     pO2, Arterial 58.0 (*)     HCO3, Arterial 15.3 (*)     Base Excess, Arterial -10.0 (*)     Hemoglobin, Art, Extended 10.4 (*)     All other components within normal limits   CBC WITH AUTO DIFFERENTIAL - Abnormal; Notable for the following components:    WBC 25.0 (*)     RBC 3.03 (*)     Hemoglobin 10.1 (*)     Hematocrit 31.4 (*)     .6 (*)     MCH 33.3 (*)     MCHC 32.2 (*)     RDW 15.1 (*)     Neutrophils % 90.0 (*)     Lymphocytes % 6.0 (*)     Neutrophils Absolute 23.0 (*)     Toxic Granulation 1+ (*)     Macrocytes 1+ (*)     All other components within normal limits   BASIC METABOLIC PANEL - Abnormal; Notable for the following components:    Sodium 134 (*)     Potassium 6.4 (*)     CO2 14 (*)     Glucose 422 (*)     BUN 63 (*)     Creatinine 2.9 (*)     GFR Non- 16 (*)     GFR  19 (*)     Calcium 8.0 (*)     All other components within normal limits    Narrative:     CALL  [facility] tel. ,  Chemistry results called to and read back by Gayatri Godoy in ICU, 10/08/2022  03:51, by MATTHEW   BASIC METABOLIC PANEL - Abnormal; Notable for the following components:    Chloride 116 (*)     CO2 17 (*)     Glucose 232 (*)     BUN 60 (*)     Creatinine 2.9 (*)     GFR Non- 16 (*)     GFR  19 (*)     Calcium 8.2 (*)     All other components within normal limits   CBC WITH AUTO DIFFERENTIAL - Abnormal; Notable for the following components:    WBC 22.1 (*)     RBC 2.72 (*)     Hemoglobin 9.0 (*)     Hematocrit 27.8 (*)     .2 (*)     MCH 33.1 (*)     MCHC 32.4 (*)     RDW 15.5 (*)     Platelets 830 (*)     Neutrophils % 86.8 (*)     Lymphocytes % 5.3 (*)     Neutrophils Absolute 19.2 (*)     All other components within normal limits   BASIC METABOLIC PANEL - Abnormal; Notable for the following components:    Sodium 149 (*)     Chloride 122 (*)     CO2 17 (*)     Glucose 150 (*)     BUN 46 (*)     Creatinine 2.6 (*)     GFR Non- 18 (*)     GFR  22 (*)     Calcium 8.3 (*)     All other components within normal limits   CBC WITH AUTO DIFFERENTIAL - Abnormal; Notable for the following components:    WBC 17.3 (*)     RBC 2.80 (*)     Hemoglobin 9.3 (*)     Hematocrit 29.1 (*)     .9 (*)     MCH 33.2 (*)     MCHC 32.0 (*)     RDW 15.9 (*)     Platelets 566 (*)     Neutrophils % 86.1 (*)     Lymphocytes % 5.7 (*)     Neutrophils Absolute 14.9 (*)     Lymphocytes Absolute 1.0 (*)     All other components within normal limits   MAGNESIUM - Abnormal; Notable for the following components:    Magnesium 1.5 (*)     All other components within normal limits   POCT GLUCOSE - Abnormal; Notable for the following components:    POC Glucose 172 (*)     All other components within normal limits   POCT GLUCOSE - Abnormal; Notable for the following components:    POC Glucose 236 (*)     All other components within normal limits   POCT GLUCOSE - Abnormal; Notable for the following components:    POC Glucose 314 (*)     All other components within normal limits   POCT GLUCOSE - Abnormal; Notable for the following components:    POC Glucose 311 (*)     All other components within normal limits   POCT GLUCOSE - Abnormal; Notable for the following components:    POC Glucose 301 (*)     All other components within normal limits   POCT GLUCOSE - Abnormal; Notable for the following components:    POC Glucose 288 (*)     All other components within normal limits   POCT GLUCOSE - Abnormal; Notable for the following components:    POC Glucose 323 (*)     All other components within normal limits   POCT GLUCOSE - Abnormal; Notable for the following components:    POC Glucose 356 (*)     All other components within normal limits   POCT GLUCOSE - Abnormal; Notable for the following components:    POC Glucose 309 (*)     All other components within normal limits   POCT GLUCOSE - Abnormal; Notable for the following components:    POC Glucose 259 (*)     All other components within normal limits   POCT GLUCOSE - Abnormal; Notable for the following components:    POC Glucose 274 (*)     All other components within normal limits   POCT GLUCOSE - Abnormal; Notable for the following components:    POC Glucose 208 (*)     All other components within normal limits   POCT GLUCOSE - Abnormal; Notable for the following components:    POC Glucose 224 (*)     All other components within normal limits   POCT GLUCOSE - Abnormal; Notable for the following components:    POC Glucose 224 (*)     All other components within normal limits   POCT GLUCOSE - Abnormal; Notable for the following components:    POC Glucose 131 (*)     All other components within normal limits   POCT GLUCOSE - Abnormal; Notable for the following components:    POC Glucose 219 (*)     All other components within normal limits   CULTURE, BLOOD 1    Narrative:     ORDER#: [id number]                          ORDERED BY: Trudi Calabrese  SOURCE: Blood LFA                          COLLECTED:  10/07/22 00:40  [facility]                      RECEIVED :  10/07/22 00:47   COVID-19, RAPID   RESPIRATORY PANEL, MOLECULAR, WITH COVID-19   CULTURE, BLOOD 1    Narrative:     ORDER#: [id number]                          ORDERED BY: CHANTE DUMONT  SOURCE: Blood Antecubital-Rig              COLLECTED:  10/09/22 12:20  [facility]                      RECEIVED :  10/09/22 12:26   CULTURE, BLOOD 2    Narrative:     ORDER#: [id number]                          ORDERED BY: Ray Ayon  SOURCE: Blood Antecubital-Rig              COLLECTED:  10/09/22 13:12  [facility]                      RECEIVED :  10/09/22 14:02   TROPONIN   BRAIN NATRIURETIC PEPTIDE   MAGNESIUM   TSH   VITAMIN D 25 HYDROXY   FERRITIN   FOLATE   LACTIC ACID   VANCOMYCIN LEVEL, TROUGH   MAGNESIUM   PHOSPHORUS   VANCOMYCIN LEVEL, TROUGH   VITAMIN D 25 HYDROXY   PTH, INTACT   PHOSPHORUS   MAGNESIUM   VANCOMYCIN LEVEL, TROUGH   MISCELLANEOUS SENDOUT   BLOOD ID PANEL, MOLECULAR   CREATININE, RANDOM URINE   SODIUM, URINE, RANDOM   PROTEIN, URINE, RANDOM   EOSINOPHIL SMEAR, URINE   OSMOLALITY, URINE   SODIUM, URINE, RANDOM   CREATININE, RANDOM URINE   PROTEIN, URINE, RANDOM   UREA NITROGEN, URINE   MISC ARUP 3   VANCOMYCIN LEVEL, TROUGH   POCT GLUCOSE   POCT GLUCOSE   POCT GLUCOSE   POCT GLUCOSE   POCT GLUCOSE   POCT GLUCOSE   POCT GLUCOSE   POCT GLUCOSE   POCT GLUCOSE   POCT GLUCOSE   POCT GLUCOSE   POCT GLUCOSE   POCT GLUCOSE   POCT GLUCOSE   POCT GLUCOSE       All other labs were within normal range or not returned as of this dictation. EMERGENCY DEPARTMENT COURSE and DIFFERENTIAL DIAGNOSIS/MDM:   Vitals:    Vitals:    10/10/22 0722 10/10/22 0724 10/10/22 0915 10/10/22 1435   BP:  127/61     Pulse:  86     Resp:  17     Temp:  98.1 °F (36.7 °C)     TempSrc:  Temporal     SpO2: 95% 100% 95% 94%   Weight:       Height:           MDM    Acute respiratory failure with hypoxia and hypercapnia. Mildly acidotic; hyperkalemic; hypotensive; responding well to IV fluids in the ED. Requiring high flow oxygen to maintain saturation. Found to have community-acquired pneumonia on top of her other comorbidities. Given Levaquin in the ED and will be admitted to the ICU. Critical Care  There was a high probability of life-threatening clinical deterioration in the patient's condition requiring my urgent intervention. Total critical care time with the patient was 40 minutes excluding separately reportable procedures. Critical care required due to patients hypotension and respiratory distress consistent with sepsis. Reassessment      CONSULTS:  IP CONSULT TO PHARMACY  IP CONSULT TO NEPHROLOGY  IP CONSULT TO INFECTIOUS DISEASES    PROCEDURES:  Unless otherwise noted below, none     Central Line    Date/Time: 10/7/2022 2:12 AM  Performed by: Chandrika Jimenez MD  Authorized by: Tiffanie Simon MD     Consent:     Consent obtained:  Verbal    Consent given by:  Patient    Risks discussed:  Arterial puncture, incorrect placement and pneumothorax    Alternatives discussed:  No treatment and delayed treatment  Universal protocol:     Patient identity confirmed:  Verbally with patient  Pre-procedure details:     Indication(s): central venous access and insufficient peripheral access      Hand hygiene: Hand hygiene performed prior to insertion      Sterile barrier technique:  All elements of maximal sterile technique followed      Skin preparation:  Chlorhexidine    Skin preparation agent: Skin preparation agent completely dried prior to procedure    Sedation:     Sedation type:  None  Anesthesia:     Anesthesia method:  Local infiltration    Local anesthetic:  Lidocaine 2% w/o epi  Procedure details:     Location:  R internal jugular    Patient position:  Trendelenburg    Procedural supplies:  Triple lumen    Catheter size:  7 Fr    Landmarks identified: yes      Ultrasound guidance: yes      Ultrasound guidance timing: prior to insertion and real time      Sterile ultrasound techniques: Sterile gel and sterile probe covers were used      Number of attempts:  1    Successful placement: yes    Post-procedure details:     Post-procedure:  Dressing applied and line sutured    Assessment:  Blood return through all ports, no pneumothorax on x-ray, placement verified by x-ray and free fluid flow    Procedure completion:  Tolerated    FINAL IMPRESSION      1. Acute respiratory failure with hypoxia and hypercapnia (HCC)    2. Community acquired pneumonia of right lower lobe of lung    3. Hyperkalemia    4. Severe sepsis [facility])          DISPOSITION/PLAN   DISPOSITION Admitted 10/06/2022 11:57:07 PM      PATIENT REFERRED TO:  No follow-up provider specified.     DISCHARGE MEDICATIONS:  Current Discharge Medication List             (Please note that portions of this note were completed with a voice recognition program.  Efforts were made to edit thedictations but occasionally words are mis-transcribed.)    Ross White MD (electronically signed)  Attending Emergency Physician         Ross White MD  10/10/22 [phone]

## 2022-10-07 NOTE — PROGRESS NOTES
Speech Language Pathology  Facility/Department: [facility] ICU   CLINICAL BEDSIDE SWALLOW EVALUATION  SPEECH PRODUCTION EVALUATION   NAME: Lauren Stack  : 1950  MRN: [id number]    ADMISSION DATE: 10/6/2022  ADMITTING DIAGNOSIS: has Respiratory failure ([address] on their problem list.    Date of Eval: 10/7/2022  Evaluating Therapist: RASHAD Tomas    Current Diet level:  NPO    Reason for Referral  Lauren Stack was referred for a bedside swallow evaluation to assess the efficiency of her swallow function, identify signs and symptoms of aspiration and make recommendations regarding safe dietary consistencies, effective compensatory strategies, and safe eating environment. Impression  Assessed patient's swallowing function. Patient exhibited decreased oral prep of more solid consistencies, inconsistently fast oral transit and suspected swallow delay with thin liquids, and sluggish laryngeal elevation for swallow airway protection. Even so, no outward S/S penetration/aspiration was noted with any ice chip trial, puree consistency trial, or regular solid consistency trial presented during evaluation this date. Just mild delayed coughs were observed with thin H2O trials. At this time, would trial regular solid consistency with least restrictive thin liquids. Will continue to follow. Thank you for this consult. Treatment Plan  Requires SLP Intervention: Yes     Recommended Diet and Intervention  Diet Solids Recommendation: Regular solid  Liquid Consistency Recommendation: Thin  Recommended Form of Meds: PO  Therapeutic Interventions: Patient/Family education;Diet tolerance monitoring     Compensatory Swallowing Strategies  Compensatory Swallowing Strategies: Upright as possible for all oral intake;Small bites/sips;Eat/Feed slowly; Alternate solids and liquids; Remain upright for 30-45 minutes after meals     Treatment/Goals  Timeframe for Short-term Goals: 1-2x/day for 3 days   Goal 1: Patient will tolerate regular solid consistency and thin liquids with min S/S penetration/aspiration during PO intake. Goal 2: Patient staff will follow swallow safety recommendations to decrease risk of penetration/aspiration during PO intake. Goal 3: Patient will complete breath support, oral motor, lingual, and pharyngeal exercises with provision of min cues/prompts. Goal 4: Monitor speech production. Goal 5: Patient will utilize tools/strategies to promote increased clarity of speech at word, phrase, and sentence level with provision of min cues/prompts. General  Chart Reviewed: Yes  Behavior/Cognition: Alert; Cooperative  O2 Device: High flow  Communication Observation: (Assessed patient's speech production. Patient exhibited decreased volume of speech and slow, decreased lingual movements during verbalizations. SLP still ranked functional intelligibility of speech for unfamiliar listeners at 100% in utterances with background noise present.)  Follows Directions: Simple   Dentition: Adequate  Patient Positioning: Upright in bed  Consistencies Administered: Ice chips;Dysphagia Pureed (Dysphagia I); Regular solid; Thin - straw     Oral Motor Examination   Labial ROM: (Adequate during labial retraction trials and labial protrusion trials.)  Labial Strength: (Adequate during labial compression trials.)  Labial Coordination: (Adequate movements were noted.)  Lingual ROM: (Adequate during lingual extension trials with full point achieved; decreased during lingual elevation trials without use of accessory jaw movement; adequate movements noted bilaterally.)  Lingual Strength: (Decreased.)  Lingual Coordination: (Slowed movements were noted.)     Assessed patient's swallowing function with the following observations noted:     Oral Phase  Mastication: Ice chips;Regular solid (Patient exhibited decreased rotary jaw movement during oral prep of ice chip trials and regular solid consistency trials presented by SLP.)  Suspected Premature Bolus Loss: Regular solid; Thin - straw (Oral transit of regular solid consistency trials varied from 1-3 seconds in length. Patient exhibited inconsistently fast oral transit of thin H2O trials presented via straw by SLP.)  Oral Phase - Comment: Oral transit of ice chip trials primarily measured 1-2 seconds in length. Oral transit of puree consistency trials, presented by SLP, primarily measured 1-2 seconds in length and no oral cavity residue was noted post swallows. Min regular solid consistency residue was observed post swallows; residue cleared from the mouth with additional dry swallows. Pharyngeal Phase  Suspected Swallow Delay: Regular solid; Thin - straw (Suspect secondary to oral transit times.)  Laryngeal Elevation: (Patient exhibited sluggish laryngeal elevation for swallow airway protection.)  Delayed Cough: Thin - straw   Pharyngeal Phase - Comment: No outward S/S penetration/aspiration was noted with any ice chip trial, puree consistency trial, or regular solid consistency trial presented during evaluation this date. Just mild delayed coughs were observed with thin H2O trials. At this time, would trial regular solid consistency with least restrictive thin liquids. Will continue to follow.      Electronically signed by RASHAD Larry on 10/7/2022 at 11:45 AM

## 2022-10-07 NOTE — PROGRESS NOTES
Hospitalist Progress Note  [facility]     Patient: Haresh Martinez  : 1950  MRN: [id number]  Code Status: Full Code    Hospital Day: 1   Date of Service: 10/7/2022    Subjective:   Patient seen and examined. No current complaints. Past Medical History:   Diagnosis Date    Chronic kidney disease     stage 4 not receiving dialysis    Diabetes mellitus ([facility] [address]     type 1 dx 1963    Gallstones     Hypertension     Lactose intolerance     Osteopenia     UTI (urinary tract infection)     in ICU in April with UTI       Past Surgical History:   Procedure Laterality Date    CARPAL TUNNEL RELEASE Bilateral     CATARACT REMOVAL      FRACTURE SURGERY         History reviewed. No pertinent family history.     Social History     Socioeconomic History    Marital status:      Spouse name: Not on file    Number of children: Not on file    Years of education: Not on file    Highest education level: Not on file   Occupational History    Not on file   Tobacco Use    Smoking status: Never    Smokeless tobacco: Never   Substance and Sexual Activity    Alcohol use: Never    Drug use: Never    Sexual activity: Not on file   Other Topics Concern    Not on file   Social History Narrative    Not on file     Social Determinants of Health     Financial Resource Strain: Not on file   Food Insecurity: Not on file   Transportation Needs: Not on file   Physical Activity: Not on file   Stress: Not on file   Social Connections: Not on file   Intimate Partner Violence: Not on file   Housing Stability: Not on file       Current Facility-Administered Medications   Medication Dose Route Frequency Provider Last Rate Last Admin    norepinephrine (LEVOPHED) 16 mg in sodium chloride 0.9 % 250 mL infusion  1-100 mcg/min IntraVENous Continuous Silvia Ivory MD 5.6 mL/hr at 10/07/22 1002 6 mcg/min at 10/07/22 1002    sodium bicarbonate tablet 650 mg  650 mg Oral 4x Daily Silvia Ivory MD        insulin lispro (HUMALOG) injection vial 0-8 Units  0-8 Units SubCUTAneous TID WC Juan Oconnor MD        insulin lispro (HUMALOG) injection vial 0-4 Units  0-4 Units SubCUTAneous Nightly Juan Oconnor MD        perflutren lipid microspheres (DEFINITY) injection 1.65 mg  1.5 mL IntraVENous ONCE PRN Ward Horowitz MD   1.5 mL at 10/07/22 0808    cefTRIAXone (ROCEPHIN) 1,000 mg in sterile water 10 mL IV syringe  1,000 mg IntraVENous Q24H Ward Horowitz MD        azithromycin (ZITHROMAX) 500 mg in dextrose 5 % 250 mL IVPB ([id number])  500 mg IntraVENous Q24H Ward Horowitz MD        0.9 % sodium chloride infusion   IntraVENous Continuous Ward Horowitz MD        glucose chewable tablet 16 g  4 tablet Oral PRN Moises Rios MD        dextrose bolus 10% 125 mL  125 mL IntraVENous PRN Moises Rios MD        Or    dextrose bolus 10% 250 mL  250 mL IntraVENous PRN Moises Rios MD        glucagon (rDNA) injection 1 mg  1 mg SubCUTAneous PRN Moises Rios MD        dextrose 10 % infusion   IntraVENous Continuous PRN Moises Rios MD        sodium chloride flush 0.9 % injection 5-40 mL  5-40 mL IntraVENous 2 times per day Juan Oconnor MD        sodium chloride flush 0.9 % injection 10 mL  10 mL IntraVENous PRN Juan Oconnor MD        0.9 % sodium chloride infusion   IntraVENous PRN Juan Oconnor MD        enoxaparin Sodium (LOVENOX) injection 30 mg  30 mg SubCUTAneous Daily Juan Oconnor MD        ondansetron (ZOFRAN-ODT) disintegrating tablet 4 mg  4 mg Oral Q8H PRN Juan Oconnor MD        Or    [facility]) injection 4 mg  4 mg IntraVENous Q6H PRN Juan Oconnor MD        polyethylene glycol (GLYCOLAX) packet 17 g  17 g Oral Daily PRN Juan Oconnor MD        acetaminophen (TYLENOL) tablet 650 mg  650 mg Oral Q6H PRN Juan Oconnor MD        Or    acetaminophen (TYLENOL) suppository 650 mg  650 mg Rectal Q6H PRN Juan Jones MD        ipratropium-albuterol (DUONEB) nebulizer solution 1 ampule  1 ampule Inhalation Q4H WA Juan Jones MD   1 ampule at 10/07/22 9904    acetylcysteine (MUCOMYST) 20 % solution 600 mg  600 mg Inhalation BID Juan Jones MD        guaiFENesin [facility]) extended release tablet 600 mg  600 mg Oral BID Juan Jones MD   600 mg at 10/07/22 0325         norepinephrine 6 mcg/min (10/07/22 1002)    sodium chloride      dextrose      sodium chloride          Objective:   BP (!) 141/50   Pulse (!) 114   Temp 98.5 °F (36.9 °C) (Temporal)   Resp 24   Ht 5' 4\" (1.626 m)   Wt 155 lb 12.8 oz (70.7 kg)   SpO2 95%   BMI 26.74 kg/m²     General: no acute distress  HEENT: normocephalic  Neck: supple, symmetrical, trachea midline   Lungs: scattered rhonchi  Cardiovascular: s1 and s2 normal  Abdomen: soft, positive bowel sounds, nondistended, nontender  Extremities: no edema or cyanosis   Neuro: aaox3, no focal deficits     Recent Labs     10/06/22  2255 10/07/22  0408   WBC 6.7 10.1   RBC 4.22 3.25*   HGB 13.8 10.9*   HCT 44.0 33.7*   .3* 103.7*   MCH 32.7* 33.5*   MCHC 31.4* 32.3*    126*     Recent Labs     10/06/22  2255 10/06/22  2311 10/07/22  0408 10/07/22  0731     --  140  --    K 6.1* 5.5 5.1* 5.7   ANIONGAP 11  --  13  --      --  111  --    CO2 19*  --  16*  --    BUN 62*  --  62*  --    CREATININE 2.5*  --  2.7*  --    GLUCOSE 216*  --  199*  --    CALCIUM 8.8  --  8.1*  --      Recent Labs     10/06/22  2256   MG 2.0     Recent Labs     10/06/22  2255   AST 30   ALT 17   BILITOT 0.4   ALKPHOS 96     No results for input(s): PH, PO2, PCO2, HCO3, BE, O2SAT in the last 72 hours. Recent Labs     10/06/22  2255   TROPONINI <0.01     No results for input(s): INR in the last 72 hours.   Recent Labs     10/07/22  1015   LACTA 1.2         Intake/Output Summary (Last 24 hours) at 10/7/2022 1117  Last data filed at 10/7/2022 1000  Gross per 24 hour   Intake 5228.58 ml   Output 300 ml   Net 4928.58 ml       XR CHEST PORTABLE    Result Date: 10/7/2022  Patient: Shekhar Ralph  Time Out: 02:24Exam(s): FILM CXR 1 VIEW EXAM:  XR Chest, 1 ViewCLINICAL HISTORY:   Reason for exam: central line placement. TECHNIQUE:  Frontal view of the chest.COMPARISON:  10/6/2022    Right central line in the upper SVCElectronically signed by Cee Mcguire MD on 10-07-22 at 0224    XR CHEST PORTABLE    Result Date: 10/6/2022  Patient: Shekhar Ralph  Time Out: 23:43Exam(s): FILM CXR 1 VIEW EXAM:  XR Chest, 1 ViewCLINICAL HISTORY:   Reason for exam: shortness of breath. TECHNIQUE:  Frontal view of the chest.COMPARISON:  No relevant prior studies available. FINDINGS:  Lungs:  Airspace opacities within the mid to lower lungs likely infectious. Pleural space:  Unremarkable. Heart:  Unremarkable. Mediastinum:  Unremarkable. Bones/joints:  Unremarkable. Airspace opacities within the mid to lower lungs likely infectious. Electronically signed by Willa Mcbride MD on 10-06-22 at 2343     Assessment and Plan:   Septic shock  CAP  UTI  Acute hypoxemic respiratory failure  YESICA/CKD unknown stage  Mild hyperkalemia resolving  Elevated D-dimer    Broad-spectrum antibiotics  Follow cultures  IVF  Norepinephrine gtt  Lactate cleared  V/Q would likely be low yield given abnormal CXR  CT angio contraindicated given YESICA/rising creatinine  No evidence of RV strain per TTE  Wells score of 1.5 correlates with low risk for PE  Respiratory status improving with current treatment plan for above issues  Renally dosed Lovenox for DVT prophylaxis  Discussed treatment plan with patient/RN    Total critical care time: 49 minutes    Marian Ayala MD   10/7/2022 11:17 AM

## 2022-10-07 NOTE — H&P
History and Physical    Patient Name:  Corinna Lowe    :  1950    Chief Complaint:   N/V    History of Present Illness:   Corinna Lowe presents to [facility] with one day history of nausea and vomiting, denies abd pain, denies diarrhea or constipation, denies fever or chills. Pt denies urinary frequency or dysuria, has had some flank pain. No hematuria. No dyspnea or left sided chest pain, no palpitations, no wheezing, no edema or calf pain. Recent admission in April for sepsis and a UTI. On presentation she was tachypneic and in respiratory distress satting in the 80s, started on high flow oxygen. She was hypotensive treated with IVF resuscitations . Levophed ordered if BP still soft. Past Medical History:   has a past medical history of Chronic kidney disease, Diabetes mellitus ([address] Gallstones, Hypertension, Lactose intolerance, Osteopenia, and UTI (urinary tract infection). Surgical History:   has a past surgical history that includes Cataract removal; Carpal tunnel release (Bilateral); and fracture surgery. Social History:   reports that she has never smoked. She has never used smokeless tobacco. She reports that she does not drink alcohol and does not use drugs. Family History:  HTN father     Medications:  Prior to Admission medications    Medication Sig Start Date End Date Taking? Authorizing Provider   insulin glargine (LANTUS) 100 UNIT/ML injection vial Inject 16 Units into the skin nightly   Yes Historical Provider, MD   insulin aspart (NOVOLOG) 100 UNIT/ML injection vial Inject into the skin 3 times daily SS before each meal   Yes Historical Provider, MD   aspirin 81 MG chewable tablet Take 81 mg by mouth daily   Yes Historical Provider, MD   atorvastatin (LIPITOR) 20 MG tablet Take 20 mg by mouth daily   Yes Historical Provider, MD       Allergies:  Pcn [penicillins]     Review of Systems:   Constitutional: there has been no unanticipated weight loss.  There's been change in energy level, sleep pattern, activity level. Eyes: No visual changes or diplopia. No scleral icterus. ENT: No Headaches, hearing loss or vertigo. No mouth sores or sore throat. Cardiovascular: No chest pain, palpitations or loss of consciousness. No pleuritic pain or phlebitis. No orthopnea, PND or peripheral edema. Respiratory: No cough or wheezing, no sputum production. No hemoptysis. No dyspnea     Gastrointestinal: No abdominal pain, blood in stools. No change in bowel habits. Had N/V. No blood per rectum. Genitourinary: No dysuria, trouble voiding, or hematuria. Musculoskeletal:  No gait disturbance, weakness or joint complaints. Integumentary: No rash or pruritis. Neurological: No headache, diplopia, change in muscle strength, numbness or tingling. Psychiatric: No anxiety, or depression. Endocrine: No temperature intolerance. No excessive thirst, fluid intake, or urination. No tremor. Hematologic/Lymphatic: No abnormal bruising or bleeding, blood clots or swollen lymph nodes. Allergic/Immunologic: No nasal congestion or hives. Physical Examination:    Vital Signs: BP (!) 89/51   Pulse (!) 104   Temp 98.9 °F (37.2 °C) (Temporal)   Resp 23   Ht 5' 4\" (1.626 m)   Wt 155 lb 12.8 oz (70.7 kg)   SpO2 93%   BMI 26.74 kg/m²   General appearance: Well preserved, mesomorphic body habitus, alert, no distress. Skin: Skin color, texture, turgor normal. No rashes or lesions. No induration or tightening palpated. Head: Normocephalic. No masses, lesions, tenderness or abnormalities  Eyes: conjunctivae/corneas clear. EOM's intact. Sclera non icteric. Ears: External ears normal.  Hearing normal to finger rub. Nose/Sinuses: Nares normal. Septum midline. Mucosa normal. No drainage or sinus tenderness. Oropharynx: Lips, mucosa, and tongue normal. Oropharynx clear with no exudate seen. Neck: Neck supple, and symmetric. No adenopathy. Trachea is midline.  Carotids brisk in upstroke without bruits, No abnormal JVP noted at 45°. Lungs: Lungs clear to auscultation bilaterally. No retractions or use of accessory muscles. No vocal fremitus. No ronchi, crackle or rale. Heart:  S1 > S2. Regular rate and rhythm. No gallop, murmur, rub, palpable thrill or heave noted. Abdomen: Abdomen soft, non-tender. BS normal. No masses, organomegaly. No hernia noted. Extremities: Extremities normal. No deformities, edema, or skin discoloration. No cyanosis or clubbing noted to the nails. Peripheral pulses 4/4. Musculoskeletal: Spine ROM normal. Muscular strength intact. Neuro: Cranial nerves intact. Motor: Strength 5/5 in all extremities. No focal weakness. Sensory: grossly normal to touch. Pertinent Labs:  CBC:   Recent Labs     10/06/22  2255   WBC 6.7   RBC 4.22   HGB 13.8   HCT 44.0   .3*   MCH 32.7*   MCHC 31.4*   RDW 14.8*      MPV 9.8     BMP:   Recent Labs     10/06/22  2255 10/06/22  2311     --    K 6.1* 5.5     --    CO2 19*  --    BUN 62*  --    CREATININE 2.5*  --    GLUCOSE 216*  --    CALCIUM 8.8  --      ABGs: No results for input(s): PO2, PCO2, PH, HCO3, BE, O2SAT in the last 72 hours. INR: No results for input(s): INR, PROTIME in the last 72 hours.   BNP:  No results found for: BNP  TSH:   Lab Results   Component Value Date    TSH 1.840 10/06/2022     Cardiac Injury Profile:   Lab Results   Component Value Date    TROPONINI <0.01 10/06/2022     Lipid Profile: No components found for: CHLPL  No results found for: TRIG  No results found for: HDL  No results found for: LDLCALC  No results found for: LABVLDL  Hemoglobin A1C: No results found for: LABA1C  No results found for: EAG      Assessment/Plan:  Sepsis vs septic shock- IVF, including boluses, levophed if still needed  Acute hypoxic resp failure- with underlying BL PNA- cont high flow ox, IV ab, mucinex - echo am   UTI- IV ab- follow cultures    Hyperkalemia - treated with IVF and kayexalate   DM1- ISS I have reviewed my findings and recommendations in detail with Galdino Vazquez.     Royal Krishnan MD

## 2022-10-07 NOTE — ED NOTES
Patient placed on cardiac monitor, continuous pulse oximeter, and NIBP monitor. Monitor alarms on.          Elmira Carter RN  10/07/22 0020

## 2022-10-08 ENCOUNTER — APPOINTMENT (OUTPATIENT)
Dept: ULTRASOUND IMAGING | Age: 72
DRG: 871 | End: 2022-10-08
Payer: MEDICARE

## 2022-10-08 LAB
ANION GAP SERPL CALCULATED.3IONS-SCNC: 12 MMOL/L (ref 7–19)
BANDED NEUTROPHILS RELATIVE PERCENT: 2 % (ref 0–5)
BASOPHILS ABSOLUTE: 0 K/UL (ref 0–0.2)
BASOPHILS RELATIVE PERCENT: 0 % (ref 0–1)
BUN BLDV-MCNC: 63 MG/DL (ref 8–23)
CALCIUM SERPL-MCNC: 8 MG/DL (ref 8.8–10.2)
CHLORIDE BLD-SCNC: 108 MMOL/L (ref 98–111)
CO2: 14 MMOL/L (ref 22–29)
CREAT SERPL-MCNC: 2.9 MG/DL (ref 0.5–0.9)
EOSINOPHILS ABSOLUTE: 0 K/UL (ref 0–0.6)
EOSINOPHILS RELATIVE PERCENT: 0 % (ref 0–5)
GFR AFRICAN AMERICAN: 19
GFR NON-AFRICAN AMERICAN: 16
GLUCOSE BLD-MCNC: 259 MG/DL (ref 70–99)
GLUCOSE BLD-MCNC: 309 MG/DL (ref 70–99)
GLUCOSE BLD-MCNC: 323 MG/DL (ref 70–99)
GLUCOSE BLD-MCNC: 356 MG/DL (ref 70–99)
GLUCOSE BLD-MCNC: 422 MG/DL (ref 74–109)
HCT VFR BLD CALC: 31.4 % (ref 37–47)
HEMOGLOBIN: 10.1 G/DL (ref 12–16)
IMMATURE GRANULOCYTES #: 0.4 K/UL
LYMPHOCYTES ABSOLUTE: 1.5 K/UL (ref 1.1–4.5)
LYMPHOCYTES RELATIVE PERCENT: 6 % (ref 20–40)
MACROCYTES: ABNORMAL
MAGNESIUM: 1.6 MG/DL (ref 1.6–2.4)
MCH RBC QN AUTO: 33.3 PG (ref 27–31)
MCHC RBC AUTO-ENTMCNC: 32.2 G/DL (ref 33–37)
MCV RBC AUTO: 103.6 FL (ref 81–99)
MONOCYTES ABSOLUTE: 0.5 K/UL (ref 0–0.9)
MONOCYTES RELATIVE PERCENT: 2 % (ref 0–10)
NEUTROPHILS ABSOLUTE: 23 K/UL (ref 1.5–7.5)
NEUTROPHILS RELATIVE PERCENT: 90 % (ref 50–65)
PDW BLD-RTO: 15.1 % (ref 11.5–14.5)
PERFORMED ON: ABNORMAL
PHOSPHORUS: 3.5 MG/DL (ref 2.5–4.5)
PLATELET # BLD: 143 K/UL (ref 130–400)
PLATELET SLIDE REVIEW: ADEQUATE
PMV BLD AUTO: 10.4 FL (ref 9.4–12.3)
POTASSIUM SERPL-SCNC: 6.4 MMOL/L (ref 3.5–5)
RBC # BLD: 3.03 M/UL (ref 4.2–5.4)
SODIUM BLD-SCNC: 134 MMOL/L (ref 136–145)
TOXIC GRANULATION: ABNORMAL
VANCOMYCIN TROUGH: 13.9 UG/ML (ref 10–20)
WBC # BLD: 25 K/UL (ref 4.8–10.8)

## 2022-10-08 PROCEDURE — 2580000003 HC RX 258: Performed by: INTERNAL MEDICINE

## 2022-10-08 PROCEDURE — 76770 US EXAM ABDO BACK WALL COMP: CPT

## 2022-10-08 PROCEDURE — 6360000002 HC RX W HCPCS: Performed by: INTERNAL MEDICINE

## 2022-10-08 PROCEDURE — 6370000000 HC RX 637 (ALT 250 FOR IP): Performed by: HOSPITALIST

## 2022-10-08 PROCEDURE — 82947 ASSAY GLUCOSE BLOOD QUANT: CPT

## 2022-10-08 PROCEDURE — 80048 BASIC METABOLIC PNL TOTAL CA: CPT

## 2022-10-08 PROCEDURE — 6370000000 HC RX 637 (ALT 250 FOR IP): Performed by: INTERNAL MEDICINE

## 2022-10-08 PROCEDURE — 80202 ASSAY OF VANCOMYCIN: CPT

## 2022-10-08 PROCEDURE — 6360000002 HC RX W HCPCS: Performed by: HOSPITALIST

## 2022-10-08 PROCEDURE — 2000000000 HC ICU R&B

## 2022-10-08 PROCEDURE — 36415 COLL VENOUS BLD VENIPUNCTURE: CPT

## 2022-10-08 PROCEDURE — 83735 ASSAY OF MAGNESIUM: CPT

## 2022-10-08 PROCEDURE — 2700000000 HC OXYGEN THERAPY PER DAY

## 2022-10-08 PROCEDURE — 93005 ELECTROCARDIOGRAM TRACING: CPT | Performed by: INTERNAL MEDICINE

## 2022-10-08 PROCEDURE — 2580000003 HC RX 258: Performed by: HOSPITALIST

## 2022-10-08 PROCEDURE — 94640 AIRWAY INHALATION TREATMENT: CPT

## 2022-10-08 PROCEDURE — 84100 ASSAY OF PHOSPHORUS: CPT

## 2022-10-08 PROCEDURE — 85025 COMPLETE CBC W/AUTO DIFF WBC: CPT

## 2022-10-08 RX ORDER — SODIUM POLYSTYRENE SULFONATE 15 G/60ML
15 SUSPENSION ORAL; RECTAL ONCE
Status: DISCONTINUED | OUTPATIENT
Start: 2022-10-08 | End: 2022-10-08

## 2022-10-08 RX ORDER — SODIUM BICARBONATE 650 MG/1
650 TABLET ORAL 4 TIMES DAILY
Status: DISCONTINUED | OUTPATIENT
Start: 2022-10-08 | End: 2022-10-13 | Stop reason: HOSPADM

## 2022-10-08 RX ADMIN — WATER 1000 MG: 1 INJECTION INTRAMUSCULAR; INTRAVENOUS; SUBCUTANEOUS at 12:54

## 2022-10-08 RX ADMIN — GUAIFENESIN 600 MG: 600 TABLET ORAL at 08:40

## 2022-10-08 RX ADMIN — INSULIN LISPRO 6 UNITS: 100 INJECTION, SOLUTION INTRAVENOUS; SUBCUTANEOUS at 08:40

## 2022-10-08 RX ADMIN — SODIUM CHLORIDE, PRESERVATIVE FREE 10 ML: 5 INJECTION INTRAVENOUS at 11:00

## 2022-10-08 RX ADMIN — SODIUM CHLORIDE, PRESERVATIVE FREE 10 ML: 5 INJECTION INTRAVENOUS at 20:52

## 2022-10-08 RX ADMIN — INSULIN LISPRO 6 UNITS: 100 INJECTION, SOLUTION INTRAVENOUS; SUBCUTANEOUS at 12:55

## 2022-10-08 RX ADMIN — SODIUM ZIRCONIUM CYCLOSILICATE 5 G: 5 POWDER, FOR SUSPENSION ORAL at 12:54

## 2022-10-08 RX ADMIN — ACETYLCYSTEINE 600 MG: 200 SOLUTION ORAL; RESPIRATORY (INHALATION) at 18:44

## 2022-10-08 RX ADMIN — GUAIFENESIN 600 MG: 600 TABLET ORAL at 20:53

## 2022-10-08 RX ADMIN — AZITHROMYCIN DIHYDRATE 500 MG: 500 INJECTION, POWDER, LYOPHILIZED, FOR SOLUTION INTRAVENOUS at 14:15

## 2022-10-08 RX ADMIN — ACETYLCYSTEINE 600 MG: 200 SOLUTION ORAL; RESPIRATORY (INHALATION) at 06:44

## 2022-10-08 RX ADMIN — IPRATROPIUM BROMIDE AND ALBUTEROL SULFATE 1 AMPULE: 2.5; .5 SOLUTION RESPIRATORY (INHALATION) at 18:44

## 2022-10-08 RX ADMIN — Medication 1000 MG: at 04:45

## 2022-10-08 RX ADMIN — IPRATROPIUM BROMIDE AND ALBUTEROL SULFATE 1 AMPULE: 2.5; .5 SOLUTION RESPIRATORY (INHALATION) at 06:43

## 2022-10-08 RX ADMIN — INSULIN GLARGINE 10 UNITS: 100 INJECTION, SOLUTION SUBCUTANEOUS at 08:40

## 2022-10-08 RX ADMIN — SODIUM CHLORIDE: 9 INJECTION, SOLUTION INTRAVENOUS at 11:00

## 2022-10-08 RX ADMIN — ENOXAPARIN SODIUM 30 MG: 100 INJECTION SUBCUTANEOUS at 08:40

## 2022-10-08 RX ADMIN — INSULIN GLARGINE 10 UNITS: 100 INJECTION, SOLUTION SUBCUTANEOUS at 20:52

## 2022-10-08 RX ADMIN — INSULIN HUMAN 10 UNITS: 100 INJECTION, SOLUTION PARENTERAL at 04:46

## 2022-10-08 RX ADMIN — SODIUM BICARBONATE 650 MG: 650 TABLET ORAL at 20:53

## 2022-10-08 RX ADMIN — SODIUM ZIRCONIUM CYCLOSILICATE 5 G: 5 POWDER, FOR SUSPENSION ORAL at 20:52

## 2022-10-08 RX ADMIN — INSULIN LISPRO 6 UNITS: 100 INJECTION, SOLUTION INTRAVENOUS; SUBCUTANEOUS at 17:50

## 2022-10-08 RX ADMIN — SODIUM BICARBONATE 650 MG: 650 TABLET ORAL at 17:50

## 2022-10-08 RX ADMIN — IPRATROPIUM BROMIDE AND ALBUTEROL SULFATE 1 AMPULE: 2.5; .5 SOLUTION RESPIRATORY (INHALATION) at 14:08

## 2022-10-08 ASSESSMENT — PAIN SCALES - GENERAL
PAINLEVEL_OUTOF10: 0

## 2022-10-08 NOTE — PROGRESS NOTES
Hospitalist Progress Note  [facility]     Patient: Jus Bhakta  : 1950  MRN: [id number]  Code Status: Full Code    Hospital Day: 2   Date of Service: 10/8/2022    Subjective:   Patient seen and examined. No current complaints. Past Medical History:   Diagnosis Date    Chronic kidney disease     stage 4 not receiving dialysis    Diabetes mellitus ([facility] [address]     type 1 dx 1963    Gallstones     Hypertension     Lactose intolerance     Osteopenia     UTI (urinary tract infection)     in ICU in April with UTI       Past Surgical History:   Procedure Laterality Date    CARPAL TUNNEL RELEASE Bilateral     CATARACT REMOVAL      FRACTURE SURGERY         History reviewed. No pertinent family history.     Social History     Socioeconomic History    Marital status:      Spouse name: Not on file    Number of children: Not on file    Years of education: Not on file    Highest education level: Not on file   Occupational History    Not on file   Tobacco Use    Smoking status: Never    Smokeless tobacco: Never   Substance and Sexual Activity    Alcohol use: Never    Drug use: Never    Sexual activity: Not on file   Other Topics Concern    Not on file   Social History Narrative    Not on file     Social Determinants of Health     Financial Resource Strain: Not on file   Food Insecurity: Not on file   Transportation Needs: Not on file   Physical Activity: Not on file   Stress: Not on file   Social Connections: Not on file   Intimate Partner Violence: Not on file   Housing Stability: Not on file       Current Facility-Administered Medications   Medication Dose Route Frequency Provider Last Rate Last Admin    sodium bicarbonate tablet 650 mg  650 mg Oral 4x Daily Layla Dalal MD        sodium zirconium cyclosilicate (LOKELMA) oral suspension 5 g  5 g Oral TID Roseann Mohamud MD   5 g at 10/08/22 1254    norepinephrine (LEVOPHED) 16 mg in sodium chloride 0.9 % 250 mL infusion  1-100 mcg/min IntraVENous Continuous Chucky Bence, MD   Stopped at 10/08/22 1130    insulin lispro (HUMALOG) injection vial 0-8 Units  0-8 Units SubCUTAneous TID WC Chucky Bence, MD   6 Units at 10/08/22 1255    insulin lispro (HUMALOG) injection vial 0-4 Units  0-4 Units SubCUTAneous Nightly Chucky Bence, MD        cefTRIAXone (ROCEPHIN) 1,000 mg in sterile water 10 mL IV syringe  1,000 mg IntraVENous Q24H Manny Wade MD   1,000 mg at 10/08/22 1254    0.9 % sodium chloride infusion   IntraVENous Continuous Manny Wade  mL/hr at 10/08/22 1103 Rate Verify at 10/08/22 1103    azithromycin (ZITHROMAX) 500 mg in sodium chloride 0.9 % 250 mL IVPB ([id number])  500 mg IntraVENous Q24H Manny Wade  mL/hr at 10/08/22 1415 500 mg at 10/08/22 1415    vancomycin (VANCOCIN) intermittent dosing (placeholder)   Other RX Placeholder Manny Wade MD        insulin glargine (LANTUS) injection vial 10 Units  10 Units SubCUTAneous BID Chucky Bence, MD   10 Units at 10/08/22 0840    polyethylene glycol (GLYCOLAX) packet 17 g  17 g Oral Daily Chucky Bence, MD        sennosides-docusate sodium (SENOKOT-S) 8.6-50 MG tablet 2 tablet  2 tablet Oral Daily Chucky Bence, MD        glucose chewable tablet 16 g  4 tablet Oral PRN Gonsalo Murphy MD        dextrose bolus 10% 125 mL  125 mL IntraVENous PRN Gonsalo Murphy MD        Or    dextrose bolus 10% 250 mL  250 mL IntraVENous PRN Gonsalo Murphy MD        glucagon (rDNA) injection 1 mg  1 mg SubCUTAneous PRN Gonsalo Murphy MD        dextrose 10 % infusion   IntraVENous Continuous PRN Gonsalo Murphy MD        sodium chloride flush 0.9 % injection 5-40 mL  5-40 mL IntraVENous 2 times per day Chucky Bence, MD   10 mL at 10/08/22 1100    sodium chloride flush 0.9 % injection 10 mL  10 mL IntraVENous PRN Chucky Bence, MD        0.9 % sodium chloride infusion   IntraVENous PRN Argie Asa Sumi Dang MD        enoxaparin Sodium (LOVENOX) injection 30 mg  30 mg SubCUTAneous Daily Rebeka Cisneros MD   30 mg at 10/08/22 0840    ondansetron (ZOFRAN-ODT) disintegrating tablet 4 mg  4 mg Oral Q8H PRN Rebeka Cisneros MD        Or    ondansetron [facility]) injection 4 mg  4 mg IntraVENous Q6H PRN Rebeka Cisneros MD        polyethylene glycol (GLYCOLAX) packet 17 g  17 g Oral Daily PRN Rebeka Cisneros MD        acetaminophen (TYLENOL) tablet 650 mg  650 mg Oral Q6H PRN Rebeka Cisneros MD        Or    acetaminophen (TYLENOL) suppository 650 mg  650 mg Rectal Q6H PRN Rebeka Cisneros MD        ipratropium-albuterol (DUONEB) nebulizer solution 1 ampule  1 ampule Inhalation Q4H WA Rebeka Cisneros MD   1 ampule at 10/08/22 1408    acetylcysteine (MUCOMYST) 20 % solution 600 mg  600 mg Inhalation BID Rebeka Cisneros MD   600 mg at 10/08/22 0644    guaiFENesin [facility]) extended release tablet 600 mg  600 mg Oral BID Rebeka Cisneros MD   600 mg at 10/08/22 0840         norepinephrine Stopped (10/08/22 1130)    sodium chloride 100 mL/hr at 10/08/22 1103    dextrose      sodium chloride          Objective:   BP (!) 105/49   Pulse (!) 105   Temp 98 °F (36.7 °C) (Temporal)   Resp (!) 31   Ht 5' 4\" (1.626 m)   Wt 156 lb 9.6 oz (71 kg)   SpO2 96%   BMI 26.88 kg/m²     General: no acute distress  HEENT: normocephalic  Neck: supple, symmetrical, trachea midline   Lungs: scattered rhonchi  Cardiovascular: s1 and s2 normal  Abdomen: soft, positive bowel sounds, nondistended, nontender  Extremities: no edema or cyanosis   Neuro: aaox3, no focal deficits    Recent Labs     10/06/22  2255 10/07/22  0408 10/08/22  0248   WBC 6.7 10.1 25.0*   RBC 4.22 3.25* 3.03*   HGB 13.8 10.9* 10.1*   HCT 44.0 33.7* 31.4*   .3* 103.7* 103.6*   MCH 32.7* 33.5* 33.3*   MCHC 31.4* 32.3* 32.2*    126* 143     Recent Labs     10/06/22  2257 10/06/22  6269 10/07/22  0408 10/07/22  0731 10/08/22  0248     --  140  --  134*   K 6.1*   < > 5.1* 5.7 6.4*   ANIONGAP 11  --  13  --  12     --  111  --  108   CO2 19*  --  16*  --  14*   BUN 62*  --  62*  --  63*   CREATININE 2.5*  --  2.7*  --  2.9*   GLUCOSE 216*  --  199*  --  422*   CALCIUM 8.8  --  8.1*  --  8.0*    < > = values in this interval not displayed. Recent Labs     10/06/22  2256 10/08/22  0248 10/08/22  0732   MG 2.0 1.6  --    PHOS  --   --  3.5     Recent Labs     10/06/22  2255   AST 30   ALT 17   BILITOT 0.4   ALKPHOS 96     No results for input(s): PH, PO2, PCO2, HCO3, BE, O2SAT in the last 72 hours. Recent Labs     10/06/22  2255   TROPONINI <0.01     No results for input(s): INR in the last 72 hours. Recent Labs     10/07/22  1015   LACTA 1.2         Intake/Output Summary (Last 24 hours) at 10/8/2022 1426  Last data filed at 10/8/2022 1103  Gross per 24 hour   Intake 3036.29 ml   Output 900 ml   Net 2136.29 ml       XR ABDOMEN (KUB) (SINGLE AP VIEW)    Result Date: 10/7/2022  Clinical history: Abdominal pain Finding: The bowel gas pattern is normal.There is fecal stasis suggesting of constipation. No evidence of organomegaly or abnormal calcifications is seen. The osseous structures of the abdomen and pelvis appear to be normal.There is mild atherosclerotic change of both iliac arteries with calcified plaque. There is a catheter seen in the pelvic region. .    Non-specific gas pattern. Fecal stasis suggesting of constipation    XR CHEST PORTABLE    Result Date: 10/7/2022  Patient: Arnold Wren  Time Out: 02:24Exam(s): FILM CXR 1 VIEW EXAM:  XR Chest, 1 ViewCLINICAL HISTORY:   Reason for exam: central line placement. TECHNIQUE:  Frontal view of the chest.COMPARISON:  10/6/2022    Right central line in the upper SVCElectronically signed by Sai Wright MD on 10-07-22 at 0224    XR CHEST PORTABLE    Result Date: 10/6/2022  Patient: Arnold Wren  Time Out: 23:43Exam(s): FILM CXR 1 VIEW EXAM:  XR Chest, 1 ViewCLINICAL HISTORY:   Reason for exam: shortness of breath. TECHNIQUE:  Frontal view of the chest.COMPARISON:  No relevant prior studies available. FINDINGS:  Lungs:  Airspace opacities within the mid to lower lungs likely infectious. Pleural space:  Unremarkable. Heart:  Unremarkable. Mediastinum:  Unremarkable. Bones/joints:  Unremarkable. Airspace opacities within the mid to lower lungs likely infectious. Electronically signed by Quinton Bolden MD on 10-06-22 at 2343    ECHO 2D [address]    Result Date: 10/7/2022  Transthoracic Echocardiography Report (TTE)  Demographics   Patient Name   Saul Thao  Date of Study           10/07/2022   MRN            [id number]        Gender                  Female   Date of Birth  1950    Room Number             EXY-0659   Age            70 year(s)   Height:        64 inches     Referring Physician     Miranda Alvarenga   Weight:        155 pounds    Sonographer             Yesenia Milan   BSA:           1.76 m^2      Interpreting Physician  Taj Molina   BMI:           26.61 kg/m^2  Procedure Type of Study   TTE procedure:ECHO 2D W/DOPPLER/COLOR/CONTRAST. Study Location: Portable Technical Quality: Limited visualization due to poor acoustical window. Patient Status: Inpatient Contrast Medium: Definity. HR: 98 bpm BP: 74/52 mmHg Indications:Dyspnea/SOB. Conclusions   Summary  Normal left ventricular size with normal LV function and an estimated  ejection fraction of approximately 70-75%. No regional wall motion  abnormalities. Normal left ventricular wall thickness. Grade II diastolic  dysfunction with evidence for increased LVEDP. Normal right ventricular size with preserved RV function (TAPSE 28 mm). Normal bi-atrial size. Mild mitral valve stenosis (mean gradient 5 mmHg at  bpm). Mild tricuspid regurgitation with estimated RVSP of 43 mm Hg, suggestive  for mild pulmonary hypertension.   Aortic root and ascending aorta are within normal limits. No evidence of significant pericardial effusion is noted. The rhythm is sinus. Hemodynamic parameters: Normal SVI 40 ml/m2 and CI 3.9 L/min/m2 (average  HR 98 bpm). No previous studies. Signature   ----------------------------------------------------------------  Electronically signed by Tc Barger(Interpreting physician)  on 10/07/2022 09:00 AM  ----------------------------------------------------------------   Findings   Mitral Valve  Mildly sclerosed posterior annulus and leaflet of the mitral valve with  mildly reduced leaflet mobility. Mild mitral valve stenosis (mean gradient  5 mmHg at  bpm). Trivial mitral regurgitation. Aortic Valve  Aortic valve appears to be tricuspid. Structurally normal aortic valve. No significant aortic regurgitation or stenosis is noted. Tricuspid Valve  Tricuspid valve is structurally normal.  Mild tricuspid regurgitation with estimated RVSP of 43 mm Hg, suggestive  for mild pulmonary hypertension. Pulmonic Valve  The pulmonic valve was not well visualized. No evidence of pulmonic stenosis. No evidence of pulmonic regurgitation. Left Atrium  Normal size left atrium. Left Ventricle  Normal left ventricular size with normal LV function and an estimated  ejection fraction of approximately 70-75%. No regional wall motion abnormalities. Normal left ventricular wall thickness. No evidence of left ventricular mass or thrombus noted. Grade II diastolic dysfunction with evidence for increased LVEDP. Right Atrium  Normal right atrial dimension with no evidence of thrombus or mass noted. Right Ventricle  Normal right ventricular size with preserved RV function (TAPSE 28 mm). Pericardial Effusion  No evidence of significant pericardial effusion is noted. Pleural Effusion  No evidence of pleural effusion. Miscellaneous  Aortic root and ascending aorta are within normal limits.   The IVC is normal.  Doppler of the pulmonary veins shows normal flow. Definity contrast was utilized to enhance the endocardial borders. The rhythm is sinus. Hemodynamic parameters: SVI 40 ml/m2 and CI 3.9 L/min/m2 (average HR 98  bpm). 2D Measurements and Calculations(cm)   LVIDd: 4.55 cm                      LVIDs: 3.28 cm  IVSd: 0.98 cm  LVPWd: 0.97 cm                      AO Root Dimension: 2.2 cm  Rt. Vent.  Dimension: 2.44 cm        LA Dimension: 3.1 cm  % Ejection Fraction: 55 %           LA Area: 12.1 cm^2  LA Volume: 22.5 ml                  LV Systolic dimension: 7.65WP  LA Volume Index: 13 ml/m^2          LV PW diastolic: 7.20GV  LV dimension: 4.55 cm               LVOT diameter: 2 cm                                      RA Systolic pressure: 3mmHg  LVEDV: 85.1 ml                      RV Diastolic dimension: 1.05EP  LVESV:25.8 ml                       LVEDVI: 48 ml/m^2  Cardic Output (CO): 6.83l/min       LVESVI: 15 ml/m^2  Ascending Aorta: 2.5 cm  Doppler Measurements and Calculations   AV Peak Velocity:196 cm/s              MV Peak E-Wave: 104 cm/s  AV Mean Velocity:130 cm/s              MV Peak A-Wave: 115 cm/s  AV Peak Gradient: 15.37 mmHg           MV E/A Ratio: 0.9 %  AV Mean Gradient: 8 mmHg               MV Mean velocity: 105cm/s  AV Area (Continuity):1.83 cm^2         MV Peak Gradient: 4.33 mmHg  AV VTI:38 cm/s                         MV Mean gradient: 5 mmHg  TR Velocity:297 cm/s                   MV P1/2t: 74 msec  TR Gradient:35.28 mmHg                 MVA by PHT2.97 cm^2  Estimated RAP:8 mmHg  RVSP:43 mmHg   MV E' septal velocity: 5.77cm/s  MV E' lateral velocity:8.38 cm/s       Assessment and Plan:   Septic shock  Likely aspiration pneumonia versus CAP  Streptococcus bacteremia  Enterococcus faecium UTI  Acute hypoxemic respiratory failure  YESICA/CKD unknown stage/hyperkalemia  Elevated D-dimer     Broad-spectrum antibiotics while awaiting finalization of cultures  IVF  Norepinephrine gtt, decreasing requirement  Lactate cleared  No mention of endocarditis per TTE  JULIO CÉSAR only if warranted  Follow repeat blood cultures  V/Q would likely be low yield given abnormal CXR  CT angio contraindicated given YESICA/rising creatinine  No evidence of RV strain per TTE  Wells score of 1.5 correlates with low risk for PE  Respiratory status improving with current treatment plan for above issues  Renal following YESICA/hyperkalemia  Follow repeat K  Renally dosed Lovenox for DVT prophylaxis  Discussed treatment plan with patient//RN    Total critical care time: 54 minutes    Blanche Miller MD   10/8/2022 2:26 PM

## 2022-10-08 NOTE — PROGRESS NOTES
Patient states she is unhappy with the care she has received. She asked to be transferred to another facility. Patient states, \"You all are trying to kill me. You aren't treating my infection or diabetes correctly. \" Tried to explain to patient the measures and protocols we are taking to care for her. Patient unresponsive to teaching.     Electronically signed by Landen Guzman RN on 10/8/2022 at 7:07 AM

## 2022-10-08 NOTE — PROGRESS NOTES
[address] [facility] Pharmacokinetic Monitoring Service - Vancomycin    Consulting Provider: Dr Leisa Suarez   Indication: Sepsis  Target Concentration: Dosing based on anticipated concentration <15 mg/L due to renal impairment/insufficiency  Day of Therapy: 2  Additional Antimicrobials: Zithromax and Rocephin    Pertinent Laboratory Values: Wt Readings from Last 1 Encounters:   10/07/22 155 lb 12.8 oz (70.7 kg)     Temp Readings from Last 1 Encounters:   10/08/22 98 °F (36.7 °C) (Temporal)     Estimated Creatinine Clearance: 17 mL/min (A) (based on SCr of 2.9 mg/dL (H)). Recent Labs     10/07/22  0408 10/08/22  0248   CREATININE 2.7* 2.9*   WBC 10.1 25.0*     Procalcitonin: 10/06= 0.48    Pertinent Cultures:  Culture Date Source Results   10/07/22 Blood Gram+ cocci   MRSA Nasal Swab: N/A. Non-respiratory infection.     Recent vancomycin administrations                     vancomycin (VANCOCIN) 1500 mg in dextrose 5% 500 mL IVPB (mg) 1,500 mg New Bag 10/07/22 0627                    Assessment:  Date/Time Current Dose Concentration Timing of Concentration (h) AUC   10/08/22 Pulse dosing 13.9 Random level     Note: Serum concentrations collected for AUC dosing may appear elevated if collected in close proximity to the dose administered, this is not necessarily an indication of toxicity    Plan:  Current dosing regimen is therapeutic  Give Vancomycin 1000mg x1 dose today with random level tomorrow morning  Repeat vancomycin concentration ordered for 10/09 @ [facility] will continue to monitor patient and adjust therapy as indicated    Thank you for the consult,  Srinivas Rose [facility]  10/8/2022 4:35 AM

## 2022-10-08 NOTE — CONSULTS
**Physician Signature**  This document was electronically signed by: J Luis Perdue MD  10/07/2022   10:26 PM    **Consult Cover Page**  FROM: [address], [phone]  Call Back Number: [phone]  SUBJECT: Consult Recommendations  Date and Time of Report: 10/07/2022 10:26 PM CT  Items Contained in this Document: Critical Care [facility]    **Consult Information**  Member Facility: [address] [place] [address] MRN: [id number]  Visit/Encounter Number: [id number]  Consult ID: [id number]  Facility Time Zone: CT  Date and Time of Request: 10/07/2022 09:37 PM  CT  Requesting Clinician: N/A  Patient Name: Janie Barragan  YOB: 1950  Gender: Female  Patient identity was confirmed at the beginning of the consult with the   patient/family/staff using two personal identifiers: Patient name and       **Reason for Consult**  Reason for Consult: [facility]    **Admission**  Admission Date: 10-  Chief reason for ICU admission: Septic Shock    **Core Metrics**  General orienting sentence for patient: 10/7: 69 y/o F w/ DM who was admitted   with SOB and nausea on 10/6. She was hypoxic in the ER. She was also   hypotensive. She was given 2 boluses and then placed on levophed, currently   @ 6mcg. She has a UTI and PNA (RLL)and is on antibiotics; azithromycin,   ceftriaxone and vancomycin. She is on 5L O2 via NC.   Chief physiologic deterioration: Systolic BP < 90 mm Pharmacologically   enhanced  Is the patient on DVT prophylaxis?: Yes  Prophylaxis type: Pharmacological  DVT Prophylaxis Comments: lovenox  Is the patient on GI prophylaxis?: Not Indicated  Has this patient reached their nutritional goal?: Yes  Are there current issues with pain management in this patient?:   No  Are there issues with skin integrity?: No  Are there issues with delirium?: No  Has the patient been mobilized?: No  Is this patient currently intubated?: No  Are there ethical or care philosophy or family issues?: No  Do you recommend an in depth evaluation?: No  Disposition Recommendations: Continue ICU level of care    **Inserted/Removed Devices**  Device Name: Central venous catheter  Site of insertion: Interjugular - Right  Date of insertion: 10-  Device Name: Platt Catheter  Site of insertion: Urethra  Date of insertion: 10-    **Physician Signature**  This document was electronically signed by: J Luis Perdue MD  10/07/2022   10:26 PM

## 2022-10-08 NOTE — PROGRESS NOTES
Patient called out with c/o of chest pain mid-sternal and continuous. 12 Lead EKG completed per protocol without acute changes. Continue to wean levophed drip as tolerated.

## 2022-10-08 NOTE — CONSULTS
Nephrology ([facility]) Consult Note      Patient:  Lauren Stack  YOB: 1950  Date of Service: 10/8/2022  MRN: [id number]   Acct: [de-identified]   Primary Care Physician: No primary care provider on file. Advance Directive: Full Code  Admit Date: 10/6/2022       Hospital Day: 2  Referring Provider: Barry Colin MD    Patient independently seen and examined, Chart, Consults, Notes, Operative notes, Labs, Cardiology, and Radiology studies reviewed as available. Subjective:  Lauren Stack is a 70 y.o. female for whom we were consulted for evaluation and treatment of acute on chronic kidney disease. Patient recalls following with nephrologist in [facility] for chronic kidney disease that she reported as near end-stage. She is unable to quantify the GFR or creatinine. She was admitted for nausea, vomiting, cystitis. She had multiple questions about her diabetes management. Nursing was present at the bedside. She denied hematuria, hemoptysis, rash, chest pain, shortness of air at rest, nausea or vomiting. She had refused Kayexalate administration.     Allergies:  Pcn [penicillins]    Medicines:  Current Facility-Administered Medications   Medication Dose Route Frequency Provider Last Rate Last Admin    sodium polystyrene (KAYEXALATE) 15 GM/60ML suspension 15 g  15 g Oral Once Vincent Thibodeaux MD        sodium bicarbonate tablet 650 mg  650 mg Oral 4x Daily Vincent Thibodeaux MD        norepinephrine (LEVOPHED) 16 mg in sodium chloride 0.9 % 250 mL infusion  1-100 mcg/min IntraVENous Continuous Vincent Thibodeaux MD 3.8 mL/hr at 10/08/22 1005 4 mcg/min at 10/08/22 1005    insulin lispro (HUMALOG) injection vial 0-8 Units  0-8 Units SubCUTAneous TID WC Vincent Thibodeaux MD   6 Units at 10/08/22 0840    insulin lispro (HUMALOG) injection vial 0-4 Units  0-4 Units SubCUTAneous Nightly Vincent Thibodeaux MD        cefTRIAXone (ROCEPHIN) 1,000 mg in sterile water 10 mL IV syringe  1,000 mg IntraVENous Q24H Jimena Syed MD   1,000 mg at 10/07/22 1339    0.9 % sodium chloride infusion   IntraVENous Continuous Jimena Syed  mL/hr at 10/08/22 0737 Rate Verify at 10/08/22 0737    azithromycin (ZITHROMAX) 500 mg in sodium chloride 0.9 % 250 mL IVPB ([id number])  500 mg IntraVENous Q24H Jimena Syed MD   Stopped at 10/07/22 1439    vancomycin (VANCOCIN) intermittent dosing (placeholder)   Other RX Placeholder Jimena Syed MD        insulin glargine (LANTUS) injection vial 10 Units  10 Units SubCUTAneous BID Marleni Eason MD   10 Units at 10/08/22 0840    polyethylene glycol (GLYCOLAX) packet 17 g  17 g Oral Daily Marleni Eason MD        sennosides-docusate sodium (SENOKOT-S) 8.6-50 MG tablet 2 tablet  2 tablet Oral Daily Marleni Eason MD        glucose chewable tablet 16 g  4 tablet Oral PRN Fortunato Silva MD        dextrose bolus 10% 125 mL  125 mL IntraVENous PRN Fortunato Silva MD        Or    dextrose bolus 10% 250 mL  250 mL IntraVENous PRN Fortunato Silva MD        glucagon (rDNA) injection 1 mg  1 mg SubCUTAneous PRN Fortunato Silva MD        dextrose 10 % infusion   IntraVENous Continuous PRN Fortunato Silva MD        sodium chloride flush 0.9 % injection 5-40 mL  5-40 mL IntraVENous 2 times per day Marleni Eason MD   10 mL at 10/07/22 2037    sodium chloride flush 0.9 % injection 10 mL  10 mL IntraVENous PRN Marleni Eason MD        0.9 % sodium chloride infusion   IntraVENous PRN Marleni Eason MD        enoxaparin Sodium (LOVENOX) injection 30 mg  30 mg SubCUTAneous Daily Marleni Eason MD   30 mg at 10/08/22 0840    ondansetron (ZOFRAN-ODT) disintegrating tablet 4 mg  4 mg Oral Q8H PRN Marleni Eason MD        Or    ondansetron [facility]) injection 4 mg  4 mg IntraVENous Q6H PRN Marleni Eason MD        polyethylene glycol (GLYCOLAX) packet 17 g  17 g Oral Daily PRN Salvador Renner MD        acetaminophen (TYLENOL) tablet 650 mg  650 mg Oral Q6H PRN Salvador Renner MD        Or    acetaminophen (TYLENOL) suppository 650 mg  650 mg Rectal Q6H PRN Salvador Renner MD        ipratropium-albuterol (DUONEB) nebulizer solution 1 ampule  1 ampule Inhalation Q4H Adalgisa Woodward MD   1 ampule at 10/08/22 4942    acetylcysteine (MUCOMYST) 20 % solution 600 mg  600 mg Inhalation BID Salvador Renner MD   600 mg at 10/08/22 0644    guaiFENesin [facility]) extended release tablet 600 mg  600 mg Oral BID Salvador Renner MD   600 mg at 10/08/22 0840       Past Medical History:  Past Medical History:   Diagnosis Date    Chronic kidney disease     stage 4 not receiving dialysis    Diabetes mellitus ([facility] [address]     type 1 dx 1963    Gallstones     Hypertension     Lactose intolerance     Osteopenia     UTI (urinary tract infection)     in ICU in April with UTI       Past Surgical History:  Past Surgical History:   Procedure Laterality Date    CARPAL TUNNEL RELEASE Bilateral     CATARACT REMOVAL      FRACTURE SURGERY         Family History  History reviewed. No pertinent family history.     Social History  Social History     Socioeconomic History    Marital status:      Spouse name: Not on file    Number of children: Not on file    Years of education: Not on file    Highest education level: Not on file   Occupational History    Not on file   Tobacco Use    Smoking status: Never    Smokeless tobacco: Never   Substance and Sexual Activity    Alcohol use: Never    Drug use: Never    Sexual activity: Not on file   Other Topics Concern    Not on file   Social History Narrative    Not on file     Social Determinants of Health     Financial Resource Strain: Not on file   Food Insecurity: Not on file   Transportation Needs: Not on file   Physical Activity: Not on file   Stress: Not on file   Social Connections: Not on file   Intimate Partner Violence: Not on file   Housing Stability: Not on file         Review of Systems:  History obtained from chart review and the patient  General ROS: No fever or chills  Respiratory ROS: No cough, shortness of breath, wheezing  Cardiovascular ROS: No chest pain or palpitations  Gastrointestinal ROS: No abdominal pain or melena  Genito-Urinary ROS: No dysuria or hematuria  Musculoskeletal ROS: No joint pain or swelling   14 point ROS reviewed with the patient and negative except as noted above and in the HPI unless unable to obtain.     Objective:  Patient Vitals for the past 24 hrs:   BP Temp Temp src Pulse Resp SpO2 Weight   10/08/22 0700 129/63 -- -- (!) 118 21 93 % --   10/08/22 0600 -- -- -- -- -- -- 156 lb 9.6 oz (71 kg)   10/08/22 0500 (!) 101/45 -- -- (!) 120 (!) 31 91 % --   10/08/22 0400 (!) 114/47 99 °F (37.2 °C) Temporal (!) 122 24 93 % --   10/08/22 0300 (!) 107/47 -- -- (!) 120 29 92 % --   10/08/22 0200 (!) 118/45 -- -- (!) 121 28 93 % --   10/08/22 0100 (!) 115/42 -- -- (!) 118 22 97 % --   10/08/22 0000 (!) 125/49 98 °F (36.7 °C) Temporal (!) 117 27 97 % --   10/07/22 2300 (!) 123/48 -- -- (!) 111 23 94 % --   10/07/22 2200 (!) 128/51 -- -- (!) 122 26 92 % --   10/07/22 2100 116/62 -- -- (!) 122 29 91 % --   10/07/22 2000 (!) 120/49 98.3 °F (36.8 °C) Temporal (!) 132 27 93 % --   10/07/22 1900 (!) 125/50 -- -- (!) 128 28 93 % --   10/07/22 1800 (!) 103/56 -- -- (!) 131 20 91 % --   10/07/22 1700 (!) 111/49 -- -- (!) 117 24 92 % --   10/07/22 1600 (!) 115/50 99.9 °F (37.7 °C) Temporal (!) 107 24 94 % --   10/07/22 1500 (!) 112/46 -- -- (!) 110 30 97 % --   10/07/22 1400 (!) 106/45 -- -- (!) 110 30 93 % --       Intake/Output Summary (Last 24 hours) at 10/8/2022 1050  Last data filed at 10/8/2022 0737  Gross per 24 hour   Intake 2717.34 ml   Output 1150 ml   Net 1567.34 ml     General: awake/alert   Chest:  clear to auscultation bilaterally  CVS: regular rate and rhythm  Abdominal: soft, nontender, normal bowel sounds  Extremities: no cyanosis or edema  Skin: warm and dry without rash      Labs:  BMP:   Recent Labs     10/06/22  2255 10/06/22  2311 10/07/22  0408 10/07/22  0731 10/08/22  0248 10/08/22  0732     --  140  --  134*  --    K 6.1*   < > 5.1* 5.7 6.4*  --      --  111  --  108  --    CO2 19*  --  16*  --  14*  --    PHOS  --   --   --   --   --  3.5   BUN 62*  --  62*  --  63*  --    CREATININE 2.5*  --  2.7*  --  2.9*  --    CALCIUM 8.8  --  8.1*  --  8.0*  --     < > = values in this interval not displayed. CBC:   Recent Labs     10/06/22  2255 10/07/22  0408 10/08/22  0248   WBC 6.7 10.1 25.0*   HGB 13.8 10.9* 10.1*   HCT 44.0 33.7* 31.4*   .3* 103.7* 103.6*    126* 143     LIVER PROFILE:   Recent Labs     10/06/22  2255   AST 30   ALT 17   BILITOT 0.4   ALKPHOS 96     PT/INR: No results for input(s): PROTIME, INR in the last 72 hours. APTT: No results for input(s): APTT in the last 72 hours. BNP:  No results for input(s): BNP in the last 72 hours. Ionized Calcium:No results for input(s): IONCA in the last 72 hours. Magnesium:  Recent Labs     10/06/22  2256 10/08/22  0248   MG 2.0 1.6     Phosphorus:  Recent Labs     10/08/22  0732   PHOS 3.5     HgbA1C: No results for input(s): LABA1C in the last 72 hours. Hepatic:   Recent Labs     10/06/22  2255   ALKPHOS 96   ALT 17   AST 30   PROT 5.9*   BILITOT 0.4   LABALBU 3.5     Lactic Acid:   Recent Labs     10/07/22  1015   LACTA 1.2     Troponin: No results for input(s): CKTOTAL, CKMB, TROPONINT in the last 72 hours. ABGs: No results for input(s): PH, PCO2, PO2, HCO3, O2SAT in the last 72 hours. CRP:  No results for input(s): CRP in the last 72 hours. Sed Rate:  No results for input(s): SEDRATE in the last 72 hours. Cultures:   No results for input(s): CULTURE in the last 72 hours. Recent Labs     10/07/22  0040 10/07/22  0145   BC No Growth to date. Any change in status will be called.   --    BLOODCULT2  -- Gram stain Anaerobic bottle: Bottle volume = 7 ml  Gram positive cocci in chains and/or pairs  resembling Streptococcus  Culture in progress  Please notify Physician  *     No results for input(s): CXSURG in the last 72 hours. Radiology reports as per the Radiologist  Radiology: XR ABDOMEN (KUB) (SINGLE AP VIEW)    Result Date: 10/7/2022  Clinical history: Abdominal pain Finding: The bowel gas pattern is normal.There is fecal stasis suggesting of constipation. No evidence of organomegaly or abnormal calcifications is seen. The osseous structures of the abdomen and pelvis appear to be normal.There is mild atherosclerotic change of both iliac arteries with calcified plaque. There is a catheter seen in the pelvic region. .    Non-specific gas pattern. Fecal stasis suggesting of constipation    XR CHEST PORTABLE    Result Date: 10/7/2022  Patient: Kathy Mota  Time Out: 02:24Exam(s): FILM CXR 1 VIEW EXAM:  XR Chest, 1 ViewCLINICAL HISTORY:   Reason for exam: central line placement. TECHNIQUE:  Frontal view of the chest.COMPARISON:  10/6/2022    Right central line in the upper SVCElectronically signed by Guru Ball MD on 10-07-22 at 0224    XR CHEST PORTABLE    Result Date: 10/6/2022  Patient: Kathy Mota  Time Out: 23:43Exam(s): FILM CXR 1 VIEW EXAM:  XR Chest, 1 ViewCLINICAL HISTORY:   Reason for exam: shortness of breath. TECHNIQUE:  Frontal view of the chest.COMPARISON:  No relevant prior studies available. FINDINGS:  Lungs:  Airspace opacities within the mid to lower lungs likely infectious. Pleural space:  Unremarkable. Heart:  Unremarkable. Mediastinum:  Unremarkable. Bones/joints:  Unremarkable. Airspace opacities within the mid to lower lungs likely infectious. Electronically signed by Alondra Miguel MD on 10-06-22 at 2343    ECHO 2D [id number]    Result Date: 10/7/2022  Transthoracic Echocardiography Report (TTE)  Demographics   Patient Name   Kathy Mota  Yousif of Study 10/07/2022   N            [id number]        Gender                  Female   Date of Birth  1950    Room Number             UWR-0705   Age            70 year(s)   Height:        64 inches     Referring Physician     Juan Oconnor   Weight:        155 pounds    Sonographer             Yesenia Milan   BSA:           1.76 m^2      Interpreting Physician  Cosmo Amezcua   BMI:           26.61 kg/m^2  Procedure Type of Study   TTE procedure:ECHO 2D W/DOPPLER/COLOR/CONTRAST. Study Location: Portable Technical Quality: Limited visualization due to poor acoustical window. Patient Status: Inpatient Contrast Medium: Definity. HR: 98 bpm BP: 74/52 mmHg Indications:Dyspnea/SOB. Conclusions   Summary  Normal left ventricular size with normal LV function and an estimated  ejection fraction of approximately 70-75%. No regional wall motion  abnormalities. Normal left ventricular wall thickness. Grade II diastolic  dysfunction with evidence for increased LVEDP. Normal right ventricular size with preserved RV function (TAPSE 28 mm). Normal bi-atrial size. Mild mitral valve stenosis (mean gradient 5 mmHg at  bpm). Mild tricuspid regurgitation with estimated RVSP of 43 mm Hg, suggestive  for mild pulmonary hypertension. Aortic root and ascending aorta are within normal limits. No evidence of significant pericardial effusion is noted. The rhythm is sinus. Hemodynamic parameters: Normal SVI 40 ml/m2 and CI 3.9 L/min/m2 (average  HR 98 bpm). No previous studies. Signature   ----------------------------------------------------------------  Electronically signed by Chung Barger(Interpreting physician)  on 10/07/2022 09:00 AM  ----------------------------------------------------------------   Findings   Mitral Valve  Mildly sclerosed posterior annulus and leaflet of the mitral valve with  mildly reduced leaflet mobility. Mild mitral valve stenosis (mean gradient  5 mmHg at  bpm).  Trivial mitral regurgitation. Aortic Valve  Aortic valve appears to be tricuspid. Structurally normal aortic valve. No significant aortic regurgitation or stenosis is noted. Tricuspid Valve  Tricuspid valve is structurally normal.  Mild tricuspid regurgitation with estimated RVSP of 43 mm Hg, suggestive  for mild pulmonary hypertension. Pulmonic Valve  The pulmonic valve was not well visualized. No evidence of pulmonic stenosis. No evidence of pulmonic regurgitation. Left Atrium  Normal size left atrium. Left Ventricle  Normal left ventricular size with normal LV function and an estimated  ejection fraction of approximately 70-75%. No regional wall motion abnormalities. Normal left ventricular wall thickness. No evidence of left ventricular mass or thrombus noted. Grade II diastolic dysfunction with evidence for increased LVEDP. Right Atrium  Normal right atrial dimension with no evidence of thrombus or mass noted. Right Ventricle  Normal right ventricular size with preserved RV function (TAPSE 28 mm). Pericardial Effusion  No evidence of significant pericardial effusion is noted. Pleural Effusion  No evidence of pleural effusion. Miscellaneous  Aortic root and ascending aorta are within normal limits. The IVC is normal.  Doppler of the pulmonary veins shows normal flow. Definity contrast was utilized to enhance the endocardial borders. The rhythm is sinus. Hemodynamic parameters: SVI 40 ml/m2 and CI 3.9 L/min/m2 (average HR 98  bpm). 2D Measurements and Calculations(cm)   LVIDd: 4.55 cm                      LVIDs: 3.28 cm  IVSd: 0.98 cm  LVPWd: 0.97 cm                      AO Root Dimension: 2.2 cm  Rt. Vent.  Dimension: 2.44 cm        LA Dimension: 3.1 cm  % Ejection Fraction: 55 %           LA Area: 12.1 cm^2  LA Volume: 22.5 ml                  LV Systolic dimension: 7.08SC  LA Volume Index: 13 ml/m^2          LV PW diastolic: 7.53XM  LV dimension: 4.55 cm               LVOT diameter: 2 cm RA Systolic pressure: 3mmHg  LVEDV: 85.1 ml                      RV Diastolic dimension: 3.56PJ  LVESV:25.8 ml                       LVEDVI: 48 ml/m^2  Cardic Output (CO): 6.83l/min       LVESVI: 15 ml/m^2  Ascending Aorta: 2.5 cm  Doppler Measurements and Calculations   AV Peak Velocity:196 cm/s              MV Peak E-Wave: 104 cm/s  AV Mean Velocity:130 cm/s              MV Peak A-Wave: 115 cm/s  AV Peak Gradient: 15.37 mmHg           MV E/A Ratio: 0.9 %  AV Mean Gradient: 8 mmHg               MV Mean velocity: 105cm/s  AV Area (Continuity):1.83 cm^2         MV Peak Gradient: 4.33 mmHg  AV VTI:38 cm/s                         MV Mean gradient: 5 mmHg  TR Velocity:297 cm/s                   MV P1/2t: 74 msec  TR Gradient:35.28 mmHg                 MVA by PHT2.97 cm^2  Estimated RAP:8 mmHg  RVSP:43 mmHg   MV E' septal velocity: 5.77cm/s  MV E' lateral velocity:8.38 cm/s         Assessment   Acute kidney injury  Chronic kidney disease unknown stage  Hyperkalemia  Acute cystitis  Septic shock  Diabetes type 1    Plan:  Discussed with patient, nursing, Dr. Amparo Ely  Work-up ordered and ongoing  Monitor labs  Asked patient to bring in records if possible documenting prior renal function  [facility] given rising potassium and risks of cardiac arrest  Bicarbonate  Reassess in the morning      Thank you for the consult, we appreciate the opportunity to provide care to your patients. Feel free to contact me if I can be of any further assistance.       Bobby Genao MD  10/08/22  10:50 AM

## 2022-10-09 ENCOUNTER — APPOINTMENT (OUTPATIENT)
Dept: CT IMAGING | Age: 72
DRG: 871 | End: 2022-10-09
Payer: MEDICARE

## 2022-10-09 LAB
ANION GAP SERPL CALCULATED.3IONS-SCNC: 9 MMOL/L (ref 7–19)
BASOPHILS ABSOLUTE: 0 K/UL (ref 0–0.2)
BASOPHILS RELATIVE PERCENT: 0.2 % (ref 0–1)
BUN BLDV-MCNC: 60 MG/DL (ref 8–23)
CALCIUM SERPL-MCNC: 8.2 MG/DL (ref 8.8–10.2)
CHLORIDE BLD-SCNC: 116 MMOL/L (ref 98–111)
CO2: 17 MMOL/L (ref 22–29)
CREAT SERPL-MCNC: 2.9 MG/DL (ref 0.5–0.9)
EOSINOPHILS ABSOLUTE: 0.2 K/UL (ref 0–0.6)
EOSINOPHILS RELATIVE PERCENT: 0.7 % (ref 0–5)
GFR AFRICAN AMERICAN: 19
GFR NON-AFRICAN AMERICAN: 16
GLUCOSE BLD-MCNC: 208 MG/DL (ref 70–99)
GLUCOSE BLD-MCNC: 224 MG/DL (ref 70–99)
GLUCOSE BLD-MCNC: 224 MG/DL (ref 70–99)
GLUCOSE BLD-MCNC: 232 MG/DL (ref 74–109)
GLUCOSE BLD-MCNC: 274 MG/DL (ref 70–99)
HCT VFR BLD CALC: 27.8 % (ref 37–47)
HEMOGLOBIN: 9 G/DL (ref 12–16)
IMMATURE GRANULOCYTES #: 0.7 K/UL
LYMPHOCYTES ABSOLUTE: 1.2 K/UL (ref 1.1–4.5)
LYMPHOCYTES RELATIVE PERCENT: 5.3 % (ref 20–40)
MAGNESIUM: 1.7 MG/DL (ref 1.6–2.4)
MCH RBC QN AUTO: 33.1 PG (ref 27–31)
MCHC RBC AUTO-ENTMCNC: 32.4 G/DL (ref 33–37)
MCV RBC AUTO: 102.2 FL (ref 81–99)
MONOCYTES ABSOLUTE: 0.9 K/UL (ref 0–0.9)
MONOCYTES RELATIVE PERCENT: 4 % (ref 0–10)
NEUTROPHILS ABSOLUTE: 19.2 K/UL (ref 1.5–7.5)
NEUTROPHILS RELATIVE PERCENT: 86.8 % (ref 50–65)
PARATHYROID HORMONE INTACT: 61.8 PG/ML (ref 15–65)
PDW BLD-RTO: 15.5 % (ref 11.5–14.5)
PERFORMED ON: ABNORMAL
PHOSPHORUS: 3 MG/DL (ref 2.5–4.5)
PLATELET # BLD: 121 K/UL (ref 130–400)
PMV BLD AUTO: 10.2 FL (ref 9.4–12.3)
POTASSIUM SERPL-SCNC: 4.8 MMOL/L (ref 3.5–5)
RBC # BLD: 2.72 M/UL (ref 4.2–5.4)
SODIUM BLD-SCNC: 142 MMOL/L (ref 136–145)
VANCOMYCIN TROUGH: 18.7 UG/ML (ref 10–20)
VITAMIN D 25-HYDROXY: 38.1 NG/ML
WBC # BLD: 22.1 K/UL (ref 4.8–10.8)

## 2022-10-09 PROCEDURE — 80048 BASIC METABOLIC PNL TOTAL CA: CPT

## 2022-10-09 PROCEDURE — 6360000002 HC RX W HCPCS: Performed by: HOSPITALIST

## 2022-10-09 PROCEDURE — 94640 AIRWAY INHALATION TREATMENT: CPT

## 2022-10-09 PROCEDURE — 2700000000 HC OXYGEN THERAPY PER DAY

## 2022-10-09 PROCEDURE — 87040 BLOOD CULTURE FOR BACTERIA: CPT

## 2022-10-09 PROCEDURE — 83735 ASSAY OF MAGNESIUM: CPT

## 2022-10-09 PROCEDURE — 85025 COMPLETE CBC W/AUTO DIFF WBC: CPT

## 2022-10-09 PROCEDURE — 82947 ASSAY GLUCOSE BLOOD QUANT: CPT

## 2022-10-09 PROCEDURE — 80202 ASSAY OF VANCOMYCIN: CPT

## 2022-10-09 PROCEDURE — 6370000000 HC RX 637 (ALT 250 FOR IP): Performed by: HOSPITALIST

## 2022-10-09 PROCEDURE — 2580000003 HC RX 258: Performed by: INTERNAL MEDICINE

## 2022-10-09 PROCEDURE — 74176 CT ABD & PELVIS W/O CONTRAST: CPT

## 2022-10-09 PROCEDURE — 6360000002 HC RX W HCPCS: Performed by: INTERNAL MEDICINE

## 2022-10-09 PROCEDURE — 84100 ASSAY OF PHOSPHORUS: CPT

## 2022-10-09 PROCEDURE — 2580000003 HC RX 258: Performed by: HOSPITALIST

## 2022-10-09 PROCEDURE — 94761 N-INVAS EAR/PLS OXIMETRY MLT: CPT

## 2022-10-09 PROCEDURE — 83970 ASSAY OF PARATHORMONE: CPT

## 2022-10-09 PROCEDURE — 82306 VITAMIN D 25 HYDROXY: CPT

## 2022-10-09 PROCEDURE — 1210000000 HC MED SURG R&B

## 2022-10-09 RX ADMIN — INSULIN GLARGINE 10 UNITS: 100 INJECTION, SOLUTION SUBCUTANEOUS at 21:50

## 2022-10-09 RX ADMIN — ACETYLCYSTEINE 600 MG: 200 SOLUTION ORAL; RESPIRATORY (INHALATION) at 06:40

## 2022-10-09 RX ADMIN — AZITHROMYCIN DIHYDRATE 500 MG: 500 INJECTION, POWDER, LYOPHILIZED, FOR SOLUTION INTRAVENOUS at 14:46

## 2022-10-09 RX ADMIN — VANCOMYCIN HYDROCHLORIDE 750 MG: 750 INJECTION, POWDER, LYOPHILIZED, FOR SOLUTION INTRAVENOUS at 05:16

## 2022-10-09 RX ADMIN — ACETYLCYSTEINE 600 MG: 200 SOLUTION ORAL; RESPIRATORY (INHALATION) at 19:04

## 2022-10-09 RX ADMIN — SODIUM BICARBONATE 650 MG: 650 TABLET ORAL at 20:07

## 2022-10-09 RX ADMIN — GUAIFENESIN 600 MG: 600 TABLET ORAL at 20:07

## 2022-10-09 RX ADMIN — IPRATROPIUM BROMIDE AND ALBUTEROL SULFATE 1 AMPULE: 2.5; .5 SOLUTION RESPIRATORY (INHALATION) at 19:04

## 2022-10-09 RX ADMIN — INSULIN LISPRO 4 UNITS: 100 INJECTION, SOLUTION INTRAVENOUS; SUBCUTANEOUS at 09:00

## 2022-10-09 RX ADMIN — SODIUM BICARBONATE 650 MG: 650 TABLET ORAL at 09:00

## 2022-10-09 RX ADMIN — IPRATROPIUM BROMIDE AND ALBUTEROL SULFATE 1 AMPULE: 2.5; .5 SOLUTION RESPIRATORY (INHALATION) at 06:40

## 2022-10-09 RX ADMIN — INSULIN GLARGINE 10 UNITS: 100 INJECTION, SOLUTION SUBCUTANEOUS at 09:00

## 2022-10-09 RX ADMIN — WATER 1000 MG: 1 INJECTION INTRAMUSCULAR; INTRAVENOUS; SUBCUTANEOUS at 12:48

## 2022-10-09 RX ADMIN — IPRATROPIUM BROMIDE AND ALBUTEROL SULFATE 1 AMPULE: 2.5; .5 SOLUTION RESPIRATORY (INHALATION) at 10:07

## 2022-10-09 RX ADMIN — SODIUM BICARBONATE 650 MG: 650 TABLET ORAL at 17:20

## 2022-10-09 RX ADMIN — ENOXAPARIN SODIUM 30 MG: 100 INJECTION SUBCUTANEOUS at 12:48

## 2022-10-09 RX ADMIN — IPRATROPIUM BROMIDE AND ALBUTEROL SULFATE 1 AMPULE: 2.5; .5 SOLUTION RESPIRATORY (INHALATION) at 14:17

## 2022-10-09 RX ADMIN — INSULIN LISPRO 2 UNITS: 100 INJECTION, SOLUTION INTRAVENOUS; SUBCUTANEOUS at 17:32

## 2022-10-09 RX ADMIN — SODIUM BICARBONATE 650 MG: 650 TABLET ORAL at 12:48

## 2022-10-09 RX ADMIN — SODIUM CHLORIDE: 9 INJECTION, SOLUTION INTRAVENOUS at 20:07

## 2022-10-09 RX ADMIN — SODIUM CHLORIDE, PRESERVATIVE FREE 10 ML: 5 INJECTION INTRAVENOUS at 09:32

## 2022-10-09 RX ADMIN — GUAIFENESIN 600 MG: 600 TABLET ORAL at 09:00

## 2022-10-09 ASSESSMENT — PAIN SCALES - GENERAL: PAINLEVEL_OUTOF10: 0

## 2022-10-09 NOTE — PROGRESS NOTES
Nephrology ([facility]) Consult Note      Patient:  Mercy Savage  YOB: 1950  Date of Service: 10/9/2022  MRN: [id number]   Acct: [de-identified]   Primary Care Physician: No primary care provider on file. Advance Directive: Full Code  Admit Date: 10/6/2022       Hospital Day: 3  Referring Provider: Noemi Guerrero MD    Patient independently seen and examined, Chart, Consults, Notes, Operative notes, Labs, Cardiology, and Radiology studies reviewed as available. Subjective:  Mercy Savage is a 70 y.o. female for whom we were consulted for evaluation and treatment of acute on chronic kidney disease. Patient recalls following with nephrologist in [facility] for chronic kidney disease that she reported as near end-stage. She is unable to quantify the GFR or creatinine. She was admitted for nausea, vomiting, cystitis. She had multiple questions about her diabetes management. Nursing was present at the bedside. She denied hematuria, hemoptysis, rash, chest pain, shortness of air at rest, nausea or vomiting. She had refused Kayexalate administration. Today, no overnight events. She tolerated Lokelma without issue with subsequent improvement in potassium. Up in chair and feeling better. Denied chest pain, shortness of air at rest, nausea or vomiting.   Seen with nursing at the bedside    Allergies:  Pcn [penicillins]    Medicines:  Current Facility-Administered Medications   Medication Dose Route Frequency Provider Last Rate Last Admin    sodium bicarbonate tablet 650 mg  650 mg Oral 4x Daily Ata Farris MD   650 mg at 10/09/22 0900    sodium zirconium cyclosilicate (LOKELMA) oral suspension 5 g  5 g Oral TID Cinda Grigsby MD   5 g at 10/08/22 2052    insulin lispro (HUMALOG) injection vial 0-8 Units  0-8 Units SubCUTAneous TID WC Ata Farris MD   4 Units at 10/09/22 0900    insulin lispro (HUMALOG) injection vial 0-4 Units  0-4 Units SubCUTAneous Nightly Tiffanie Simon MD        cefTRIAXone (ROCEPHIN) 1,000 mg in sterile water 10 mL IV syringe  1,000 mg IntraVENous Q24H Ashley Rodriguez MD   1,000 mg at 10/08/22 1254    0.9 % sodium chloride infusion   IntraVENous Continuous Ashley Rodriguez  mL/hr at 10/09/22 1006 Rate Verify at 10/09/22 1006    azithromycin (ZITHROMAX) 500 mg in sodium chloride 0.9 % 250 mL IVPB ([id number])  500 mg IntraVENous Q24H Ashley Rodriguez MD   Stopped at 10/08/22 1515    vancomycin (VANCOCIN) intermittent dosing (placeholder)   Other RX Placeholder Ashley Rodriguez MD        insulin glargine (LANTUS) injection vial 10 Units  10 Units SubCUTAneous BID Tiffanie Simon MD   10 Units at 10/09/22 0900    polyethylene glycol (GLYCOLAX) packet 17 g  17 g Oral Daily Tiffanie Simon MD        sennosides-docusate sodium (SENOKOT-S) 8.6-50 MG tablet 2 tablet  2 tablet Oral Daily Tiffanie Simon MD        glucose chewable tablet 16 g  4 tablet Oral PRN Chandrika Jimenez MD        dextrose bolus 10% 125 mL  125 mL IntraVENous PRN Chandrika Jimenez MD        Or    dextrose bolus 10% 250 mL  250 mL IntraVENous PRN Chandrika Jimenez MD        glucagon (rDNA) injection 1 mg  1 mg SubCUTAneous PRN Chandrika Jimenez MD        dextrose 10 % infusion   IntraVENous Continuous PRN Chandrika Jimenez MD        sodium chloride flush 0.9 % injection 5-40 mL  5-40 mL IntraVENous 2 times per day Tiffanie Simon MD   10 mL at 10/09/22 0932    sodium chloride flush 0.9 % injection 10 mL  10 mL IntraVENous PRN Tiffanie Simon MD        0.9 % sodium chloride infusion   IntraVENous PRN Tiffanie Simon MD        enoxaparin Sodium (LOVENOX) injection 30 mg  30 mg SubCUTAneous Daily Tiffanie Simon MD   30 mg at 10/08/22 0840    ondansetron (ZOFRAN-ODT) disintegrating tablet 4 mg  4 mg Oral Q8H PRN Tiffanie Simon MD        Or    [facility]ansetron [facility]) injection 4 mg  4 mg IntraVENous Q6H PRN Chucky Bence, MD        polyethylene glycol [facility]) packet 17 g  17 g Oral Daily PRN Chucky Bence, MD        acetaminophen (TYLENOL) tablet 650 mg  650 mg Oral Q6H PRN Chucky Bence, MD        Or    acetaminophen (TYLENOL) suppository 650 mg  650 mg Rectal Q6H PRN Chucky Bence, MD        ipratropium-albuterol (DUONEB) nebulizer solution 1 ampule  1 ampule Inhalation Q4H WA Chucky Bence, MD   1 ampule at 10/09/22 1007    acetylcysteine (MUCOMYST) 20 % solution 600 mg  600 mg Inhalation BID Chucky Bence, MD   600 mg at 10/09/22 0640    guaiFENesin (MUCINEX) extended release tablet 600 mg  600 mg Oral BID Chucky Bence, MD   600 mg at 10/09/22 0900       Past Medical History:  Past Medical History:   Diagnosis Date    Chronic kidney disease     stage 4 not receiving dialysis    Diabetes mellitus ([facility] [address]     type 1 dx 1963    Gallstones     Hypertension     Lactose intolerance     Osteopenia     UTI (urinary tract infection)     in ICU in April with UTI       Past Surgical History:  Past Surgical History:   Procedure Laterality Date    CARPAL TUNNEL RELEASE Bilateral     CATARACT REMOVAL      FRACTURE SURGERY         Family History  History reviewed. No pertinent family history.     Social History  Social History     Socioeconomic History    Marital status:      Spouse name: Not on file    Number of children: Not on file    Years of education: Not on file    Highest education level: Not on file   Occupational History    Not on file   Tobacco Use    Smoking status: Never    Smokeless tobacco: Never   Substance and Sexual Activity    Alcohol use: Never    Drug use: Never    Sexual activity: Not on file   Other Topics Concern    Not on file   Social History Narrative    Not on file     Social Determinants of Health     Financial Resource Strain: Not on file   Food Insecurity: Not on file   Transportation Needs: Not on file   Physical Activity: Not on file   Stress: Not on file   Social Connections: Not on file   Intimate Partner Violence: Not on file   Housing Stability: Not on file         Review of Systems:  History obtained from chart review and the patient  General ROS: No fever or chills  Respiratory ROS: No cough, shortness of breath, wheezing  Cardiovascular ROS: No chest pain or palpitations  Gastrointestinal ROS: No abdominal pain or melena  Genito-Urinary ROS: No dysuria or hematuria  Musculoskeletal ROS: No joint pain or swelling   14 point ROS reviewed with the patient and negative except as noted above and in the HPI unless unable to obtain.     Objective:  Patient Vitals for the past 24 hrs:   BP Temp Temp src Pulse Resp SpO2 Weight   10/09/22 0900 (!) 122/98 -- -- 97 29 92 % --   10/09/22 0800 (!) 116/55 98.2 °F (36.8 °C) Temporal 99 (!) 34 90 % --   10/09/22 0600 (!) 118/52 -- -- 99 (!) 31 (!) 87 % --   10/09/22 0532 -- -- -- -- -- -- 161 lb (73 kg)   10/09/22 0500 (!) 116/55 -- -- 99 (!) 33 93 % --   10/09/22 0400 (!) 117/53 -- -- 92 25 94 % --   10/09/22 0300 (!) 117/50 -- -- 100 (!) 36 91 % --   10/09/22 0200 (!) 115/53 -- -- 92 24 93 % --   10/09/22 0100 (!) 117/51 -- -- 95 26 94 % --   10/09/22 0000 (!) 118/51 99.4 °F (37.4 °C) Temporal 100 25 95 % --   10/08/22 2300 (!) 117/53 -- -- (!) 103 23 93 % --   10/08/22 2200 (!) 118/52 -- -- 100 23 94 % --   10/08/22 2100 (!) 124/58 -- -- (!) 115 17 92 % --   10/08/22 2000 (!) 105/56 99.3 °F (37.4 °C) Temporal (!) 111 24 95 % --   10/08/22 1900 (!) 95/52 -- -- (!) 104 30 98 % --   10/08/22 1800 (!) 116/55 -- -- (!) 108 12 94 % --   10/08/22 1700 (!) 110/45 -- -- (!) 102 28 94 % --   10/08/22 1600 (!) 105/50 98.3 °F (36.8 °C) Temporal (!) 106 29 94 % --   10/08/22 1500 (!) 99/54 -- -- (!) 113 24 93 % --   10/08/22 1430 (!) 104/52 -- -- (!) 111 29 94 % --   10/08/22 1400 (!) 105/49 -- -- (!) 105 (!) 31 96 % --   10/08/22 1300 (!) 125/94 -- -- (!) 111 19 94 % --   10/08/22 1200 (!) 130/55 98 °F (36.7 °C) Temporal (!) 114 20 93 % --       Intake/Output Summary (Last 24 hours) at 10/9/2022 1157  Last data filed at 10/9/2022 1006  Gross per 24 hour   Intake 3012.8 ml   Output --   Net 3012.8 ml     General: awake/alert   Chest:  clear to auscultation bilaterally  CVS: regular rate and rhythm  Abdominal: soft, nontender, normal bowel sounds  Extremities: no cyanosis or edema  Skin: warm and dry without rash      Labs:  BMP:   Recent Labs     10/07/22  0408 10/07/22  0731 10/08/22  0248 10/08/22  0732 10/09/22  0245     --  134*  --  142   K 5.1* 5.7 6.4*  --  4.8     --  108  --  116*   CO2 16*  --  14*  --  17*   PHOS  --   --   --  3.5 3.0   BUN 62*  --  63*  --  60*   CREATININE 2.7*  --  2.9*  --  2.9*   CALCIUM 8.1*  --  8.0*  --  8.2*     CBC:   Recent Labs     10/07/22  0408 10/08/22  0248 10/09/22  0245   WBC 10.1 25.0* 22.1*   HGB 10.9* 10.1* 9.0*   HCT 33.7* 31.4* 27.8*   .7* 103.6* 102.2*   * 143 121*     LIVER PROFILE:   Recent Labs     10/06/22  2255   AST 30   ALT 17   BILITOT 0.4   ALKPHOS 96     PT/INR: No results for input(s): PROTIME, INR in the last 72 hours. APTT: No results for input(s): APTT in the last 72 hours. BNP:  No results for input(s): BNP in the last 72 hours. Ionized Calcium:No results for input(s): IONCA in the last 72 hours. Magnesium:  Recent Labs     10/06/22  2256 10/08/22  0248 10/09/22  0245   MG 2.0 1.6 1.7     Phosphorus:  Recent Labs     10/08/22  0732 10/09/22  0245   PHOS 3.5 3.0     HgbA1C: No results for input(s): LABA1C in the last 72 hours. Hepatic:   Recent Labs     10/06/22  2255   ALKPHOS 96   ALT 17   AST 30   PROT 5.9*   BILITOT 0.4   LABALBU 3.5     Lactic Acid:   Recent Labs     10/07/22  1015   LACTA 1.2     Troponin: No results for input(s): CKTOTAL, CKMB, TROPONINT in the last 72 hours. ABGs: No results for input(s): PH, PCO2, PO2, HCO3, O2SAT in the last 72 hours.   CRP:  No results for input(s): CRP in the last 72 hours. Sed Rate:  No results for input(s): SEDRATE in the last 72 hours. Cultures:   No results for input(s): CULTURE in the last 72 hours. Recent Labs     10/07/22  0040 10/07/22  0145   BC No Growth to date. Any change in status will be called. --    BLOODCULT2  --  Gram stain Anaerobic bottle: Bottle volume = 7 ml  Gram positive cocci in chains and/or pairs  resembling Streptococcus  Culture in progress  Please notify Physician  *  Isolated from Aerobic and Anaerobic bottle  Sensitivity to follow       No results for input(s): CXSURG in the last 72 hours. Radiology reports as per the Radiologist  Radiology: XR ABDOMEN (KUB) (SINGLE AP VIEW)    Result Date: 10/7/2022  Clinical history: Abdominal pain Finding: The bowel gas pattern is normal.There is fecal stasis suggesting of constipation. No evidence of organomegaly or abnormal calcifications is seen. The osseous structures of the abdomen and pelvis appear to be normal.There is mild atherosclerotic change of both iliac arteries with calcified plaque. There is a catheter seen in the pelvic region. .    Non-specific gas pattern. Fecal stasis suggesting of constipation    XR CHEST PORTABLE    Result Date: 10/7/2022  Patient: Graham Romano  Time Out: 02:24Exam(s): FILM CXR 1 VIEW EXAM:  XR Chest, 1 ViewCLINICAL HISTORY:   Reason for exam: central line placement. TECHNIQUE:  Frontal view of the chest.COMPARISON:  10/6/2022    Right central line in the upper SVCElectronically signed by Celina Segovia MD on 10-07-22 at 0224    XR CHEST PORTABLE    Result Date: 10/6/2022  Patient: Graham Romano  Time Out: 23:43Exam(s): FILM CXR 1 VIEW EXAM:  XR Chest, 1 ViewCLINICAL HISTORY:   Reason for exam: shortness of breath. TECHNIQUE:  Frontal view of the chest.COMPARISON:  No relevant prior studies available. FINDINGS:  Lungs:  Airspace opacities within the mid to lower lungs likely infectious. Pleural space:  Unremarkable. Heart:  Unremarkable.   Mediastinum: Unremarkable. Bones/joints:  Unremarkable. Airspace opacities within the mid to lower lungs likely infectious. Electronically signed by Josesito Anthony MD on 10-06-22 at 2343    ECHO 2D [address]    Result Date: 10/7/2022  Transthoracic Echocardiography Report (TTE)  Demographics   Patient Name   Mario Waite  Date of Study           10/07/2022   MRN            [id number]        Gender                  Female   Date of Birth  1950    Room Number             LES-1019   Age            70 year(s)   Height:        64 inches     Referring Physician     Carlos Manuel Hidalgo   Weight:        155 pounds    Sonographer             Yesenia Milan   BSA:           1.76 m^2      Interpreting Physician  Reyna Mcwilliams   BMI:           26.61 kg/m^2  Procedure Type of Study   TTE procedure:ECHO 2D W/DOPPLER/COLOR/CONTRAST. Study Location: Portable Technical Quality: Limited visualization due to poor acoustical window. Patient Status: Inpatient Contrast Medium: Definity. HR: 98 bpm BP: 74/52 mmHg Indications:Dyspnea/SOB. Conclusions   Summary  Normal left ventricular size with normal LV function and an estimated  ejection fraction of approximately 70-75%. No regional wall motion  abnormalities. Normal left ventricular wall thickness. Grade II diastolic  dysfunction with evidence for increased LVEDP. Normal right ventricular size with preserved RV function (TAPSE 28 mm). Normal bi-atrial size. Mild mitral valve stenosis (mean gradient 5 mmHg at  bpm). Mild tricuspid regurgitation with estimated RVSP of 43 mm Hg, suggestive  for mild pulmonary hypertension. Aortic root and ascending aorta are within normal limits. No evidence of significant pericardial effusion is noted. The rhythm is sinus. Hemodynamic parameters: Normal SVI 40 ml/m2 and CI 3.9 L/min/m2 (average  HR 98 bpm). No previous studies.    Signature   ----------------------------------------------------------------  Electronically signed by Erin Barger(Interpreting physician)  on 10/07/2022 09:00 AM  ----------------------------------------------------------------   Findings   Mitral Valve  Mildly sclerosed posterior annulus and leaflet of the mitral valve with  mildly reduced leaflet mobility. Mild mitral valve stenosis (mean gradient  5 mmHg at  bpm). Trivial mitral regurgitation. Aortic Valve  Aortic valve appears to be tricuspid. Structurally normal aortic valve. No significant aortic regurgitation or stenosis is noted. Tricuspid Valve  Tricuspid valve is structurally normal.  Mild tricuspid regurgitation with estimated RVSP of 43 mm Hg, suggestive  for mild pulmonary hypertension. Pulmonic Valve  The pulmonic valve was not well visualized. No evidence of pulmonic stenosis. No evidence of pulmonic regurgitation. Left Atrium  Normal size left atrium. Left Ventricle  Normal left ventricular size with normal LV function and an estimated  ejection fraction of approximately 70-75%. No regional wall motion abnormalities. Normal left ventricular wall thickness. No evidence of left ventricular mass or thrombus noted. Grade II diastolic dysfunction with evidence for increased LVEDP. Right Atrium  Normal right atrial dimension with no evidence of thrombus or mass noted. Right Ventricle  Normal right ventricular size with preserved RV function (TAPSE 28 mm). Pericardial Effusion  No evidence of significant pericardial effusion is noted. Pleural Effusion  No evidence of pleural effusion. Miscellaneous  Aortic root and ascending aorta are within normal limits. The IVC is normal.  Doppler of the pulmonary veins shows normal flow. Definity contrast was utilized to enhance the endocardial borders. The rhythm is sinus. Hemodynamic parameters: SVI 40 ml/m2 and CI 3.9 L/min/m2 (average HR 98  bpm).   2D Measurements and Calculations(cm)   LVIDd: 4.55 cm                      LVIDs: 3.28 cm  IVSd: 0.98 cm  LVPWd: 0.97 cm AO Root Dimension: 2.2 cm  Rt. Vent. Dimension: 2.44 cm        LA Dimension: 3.1 cm  % Ejection Fraction: 55 %           LA Area: 12.1 cm^2  LA Volume: 22.5 ml                  LV Systolic dimension: 7.38ZS  LA Volume Index: 13 ml/m^2          LV PW diastolic: 8.55LC  LV dimension: 4.55 cm               LVOT diameter: 2 cm                                      RA Systolic pressure: 3mmHg  LVEDV: 85.1 ml                      RV Diastolic dimension: 6.81YG  LVESV:25.8 ml                       LVEDVI: 48 ml/m^2  Cardic Output (CO): 6.83l/min       LVESVI: 15 ml/m^2  Ascending Aorta: 2.5 cm  Doppler Measurements and Calculations   AV Peak Velocity:196 cm/s              MV Peak E-Wave: 104 cm/s  AV Mean Velocity:130 cm/s              MV Peak A-Wave: 115 cm/s  AV Peak Gradient: 15.37 mmHg           MV E/A Ratio: 0.9 %  AV Mean Gradient: 8 mmHg               MV Mean velocity: 105cm/s  AV Area (Continuity):1.83 cm^2         MV Peak Gradient: 4.33 mmHg  AV VTI:38 cm/s                         MV Mean gradient: 5 mmHg  TR Velocity:297 cm/s                   MV P1/2t: 74 msec  TR Gradient:35.28 mmHg                 MVA by PHT2.97 cm^2  Estimated RAP:8 mmHg  RVSP:43 mmHg   MV E' septal velocity: 5.77cm/s  MV E' lateral velocity:8.38 cm/s         Assessment   Acute kidney injury  Chronic kidney disease stage IV with baseline creatinine for about a month ago 2.4  Hyperkalemia  Acute cystitis  Septic shock  Diabetes type 1    Plan:  Discussed with patient, nursing  Work-up reviewed todate  Monitor labs  Potassium better with Lokelma, redose as needed  Bicarbonate  Reassess in the morning  Antibiotics per primary service    Thank you for the consult, we appreciate the opportunity to provide care to your patients. Feel free to contact me if I can be of any further assistance.       Felisa Vivar MD  10/09/22  11:57 AM

## 2022-10-09 NOTE — PROGRESS NOTES
[address] [facility] Pharmacokinetic Monitoring Service - Vancomycin    Consulting Provider: Dr Carlo Lawrence   Indication: Sepsis  Target Concentration: Dosing based on anticipated concentration <15 mg/L due to renal impairment/insufficiency  Day of Therapy: 3  Additional Antimicrobials: Pulse dosing    Pertinent Laboratory Values: Wt Readings from Last 1 Encounters:   10/08/22 156 lb 9.6 oz (71 kg)     Temp Readings from Last 1 Encounters:   10/09/22 99.4 °F (37.4 °C) (Temporal)     Estimated Creatinine Clearance: 17 mL/min (A) (based on SCr of 2.9 mg/dL (H)). Recent Labs     10/08/22  0248 10/09/22  0245   CREATININE 2.9* 2.9*   WBC 25.0* 22.1*     Procalcitonin: 10/06= 0.48    Pertinent Cultures:  Culture Date Source Results   10/07/22 Blood Gram + cocci   10/07/22 Urine Enterococcus facium   MRSA Nasal Swab: N/A. Non-respiratory infection.     Recent vancomycin administrations                     vancomycin (VANCOCIN) 1000 mg in sodium chloride 0.9% 250 mL IVPB (mg) 1,000 mg New Bag 10/08/22 0445    vancomycin (VANCOCIN) 1500 mg in dextrose 5% 500 mL IVPB (mg) 1,500 mg New Bag 10/07/22 0627                    Assessment:  Date/Time Current Dose Concentration Timing of Concentration (h) AUC   10/9/22 Pulse dosing 18.7 Random level     Note: Serum concentrations collected for AUC dosing may appear elevated if collected in close proximity to the dose administered, this is not necessarily an indication of toxicity    Plan:  Current dosing regimen is therapeutic  Give Vancomycin 750mg x1 dose with random level tomorrow morning  Repeat vancomycin concentration ordered for 10/10 @ 0300   Pharmacy will continue to monitor patient and adjust therapy as indicated    Thank you for the consult,  ODIN Gregorio [facility]  10/9/2022 3:56 AM

## 2022-10-09 NOTE — PROGRESS NOTES
Hospitalist Progress Note  [facility]     Patient: Bryan Lang  : 1950  MRN: [id number]  Code Status: Full Code    Hospital Day: 3   Date of Service: 10/9/2022    Subjective:   Patient seen and examined. Sitting in chair. Starting to feel better. No current complaints. Past Medical History:   Diagnosis Date    Chronic kidney disease     stage 4 not receiving dialysis    Diabetes mellitus ([address]     type 1 dx     Gallstones     Hypertension     Lactose intolerance     Osteopenia     UTI (urinary tract infection)     in ICU in April with UTI       Past Surgical History:   Procedure Laterality Date    CARPAL TUNNEL RELEASE Bilateral     CATARACT REMOVAL      FRACTURE SURGERY         History reviewed. No pertinent family history.     Social History     Socioeconomic History    Marital status:      Spouse name: Not on file    Number of children: Not on file    Years of education: Not on file    Highest education level: Not on file   Occupational History    Not on file   Tobacco Use    Smoking status: Never    Smokeless tobacco: Never   Substance and Sexual Activity    Alcohol use: Never    Drug use: Never    Sexual activity: Not on file   Other Topics Concern    Not on file   Social History Narrative    Not on file     Social Determinants of Health     Financial Resource Strain: Not on file   Food Insecurity: Not on file   Transportation Needs: Not on file   Physical Activity: Not on file   Stress: Not on file   Social Connections: Not on file   Intimate Partner Violence: Not on file   Housing Stability: Not on file       Current Facility-Administered Medications   Medication Dose Route Frequency Provider Last Rate Last Admin    sodium bicarbonate tablet 650 mg  650 mg Oral 4x Daily Jose Webber MD   650 mg at 10/09/22 0900    sodium zirconium cyclosilicate (LOKELMA) oral suspension 5 g  5 g Oral TID Antonina Madrigal MD   5 g at 10/08/22 2052    norepinephrine (LEVOPHED) 16 mg in sodium chloride 0.9 % 250 mL infusion  1-100 mcg/min IntraVENous Continuous Carlos Manuel Hidalgo MD   Stopped at 10/08/22 1130    insulin lispro (HUMALOG) injection vial 0-8 Units  0-8 Units SubCUTAneous TID WC Carlos Manuel Hidalgo MD   4 Units at 10/09/22 0900    insulin lispro (HUMALOG) injection vial 0-4 Units  0-4 Units SubCUTAneous Nightly Carlos Manuel Hidalgo MD        cefTRIAXone (ROCEPHIN) 1,000 mg in sterile water 10 mL IV syringe  1,000 mg IntraVENous Q24H Vladimir Russo MD   1,000 mg at 10/08/22 1254    0.9 % sodium chloride infusion   IntraVENous Continuous Vladimir Russo  mL/hr at 10/09/22 1006 Rate Verify at 10/09/22 1006    azithromycin (ZITHROMAX) 500 mg in sodium chloride 0.9 % 250 mL IVPB ([id number])  500 mg IntraVENous Q24H Vladimir Russo MD   Stopped at 10/08/22 1515    vancomycin (VANCOCIN) intermittent dosing (placeholder)   Other RX Placeholder Vladimir Russo MD        insulin glargine (LANTUS) injection vial 10 Units  10 Units SubCUTAneous BID Carlos Manuel Hidalgo MD   10 Units at 10/09/22 0900    polyethylene glycol (GLYCOLAX) packet 17 g  17 g Oral Daily Carlos Manuel Hidalgo MD        sennosides-docusate sodium (SENOKOT-S) 8.6-50 MG tablet 2 tablet  2 tablet Oral Daily Carlos Manuel Hidalgo MD        glucose chewable tablet 16 g  4 tablet Oral PRN Eri Beyer MD        dextrose bolus 10% 125 mL  125 mL IntraVENous PRN Eri Beyer MD        Or    dextrose bolus 10% 250 mL  250 mL IntraVENous PRN Eri Beyer MD        glucagon (rDNA) injection 1 mg  1 mg SubCUTAneous PRN Eri Beyer MD        dextrose 10 % infusion   IntraVENous Continuous PRN Eri Beyer MD        sodium chloride flush 0.9 % injection 5-40 mL  5-40 mL IntraVENous 2 times per day Carlos Manuel Hidalgo MD   10 mL at 10/09/22 0932    sodium chloride flush 0.9 % injection 10 mL  10 mL IntraVENous PRN Carlos Manuel Hidalgo MD        0.9 % sodium chloride infusion   IntraVENous PRN Idalia Mcgee MD        enoxaparin Sodium (LOVENOX) injection 30 mg  30 mg SubCUTAneous Daily Idalia Mcgee MD   30 mg at 10/08/22 0840    ondansetron (ZOFRAN-ODT) disintegrating tablet 4 mg  4 mg Oral Q8H PRN Idalia Mcgee MD        Or    ondansetron [facility]) injection 4 mg  4 mg IntraVENous Q6H PRN Idalia Mcgee MD        polyethylene glycol (GLYCOLAX) packet 17 g  17 g Oral Daily PRN Idalia Mcgee MD        acetaminophen (TYLENOL) tablet 650 mg  650 mg Oral Q6H PRN Idalia Mcgee MD        Or    acetaminophen (TYLENOL) suppository 650 mg  650 mg Rectal Q6H PRN Idalia Mcgee MD        ipratropium-albuterol (DUONEB) nebulizer solution 1 ampule  1 ampule Inhalation Q4H WA Idalia Mcgee MD   1 ampule at 10/09/22 1007    acetylcysteine (MUCOMYST) 20 % solution 600 mg  600 mg Inhalation BID Idalia Mcgee MD   600 mg at 10/09/22 0640    guaiFENesin (MUCINEX) extended release tablet 600 mg  600 mg Oral BID Idalia Mcgee MD   600 mg at 10/09/22 0900         norepinephrine Stopped (10/08/22 1130)    sodium chloride 100 mL/hr at 10/09/22 1006    dextrose      sodium chloride          Objective:   BP (!) 122/98   Pulse 97   Temp 98.2 °F (36.8 °C) (Temporal)   Resp 29   Ht 5' 4\" (1.626 m)   Wt 161 lb (73 kg)   SpO2 92%   BMI 27.64 kg/m²     General: no acute distress  HEENT: normocephalic  Neck: supple, symmetrical, trachea midline   Lungs: scattered rhonchi  Cardiovascular: s1 and s2 normal  Abdomen: soft, positive bowel sounds, nondistended, nontender  Extremities: no edema or cyanosis   Neuro: aaox3, no focal deficits    Recent Labs     10/07/22  0408 10/08/22  0248 10/09/22  0245   WBC 10.1 25.0* 22.1*   RBC 3.25* 3.03* 2.72*   HGB 10.9* 10.1* 9.0*   HCT 33.7* 31.4* 27.8*   .7* 103.6* 102.2*   MCH 33.5* 33.3* 33.1*   MCHC 32.3* 32.2* 32.4*   * 143 121*     Recent Labs 10/07/22  0408 10/07/22  0731 10/08/22  0248 10/09/22  0245     --  134* 142   K 5.1* 5.7 6.4* 4.8   ANIONGAP 13  --  12 9     --  108 116*   CO2 16*  --  14* 17*   BUN 62*  --  63* 60*   CREATININE 2.7*  --  2.9* 2.9*   GLUCOSE 199*  --  422* 232*   CALCIUM 8.1*  --  8.0* 8.2*     Recent Labs     10/06/22  2256 10/08/22  0248 10/08/22  0732 10/09/22  0245   MG 2.0 1.6  --  1.7   PHOS  --   --  3.5 3.0     Recent Labs     10/06/22  2255   AST 30   ALT 17   BILITOT 0.4   ALKPHOS 96     No results for input(s): PH, PO2, PCO2, HCO3, BE, O2SAT in the last 72 hours. Recent Labs     10/06/22  2255   TROPONINI <0.01     No results for input(s): INR in the last 72 hours. Recent Labs     10/07/22  1015   LACTA 1.2         Intake/Output Summary (Last 24 hours) at 10/9/2022 1120  Last data filed at 10/9/2022 1006  Gross per 24 hour   Intake 3071.21 ml   Output --   Net 3071.21 ml       US RENAL COMPLETE    Result Date: 10/8/2022  Clinical history: Worsening creatinine Finding: Real-time sonogram study of the right and left kidneys shows normal size and shaped kidneys. The right kidney measures 9.7 x 3.9 x 4.8 cm and the left kidney measures 8.5 x 4.0 x 5.4 cm. The cortical thickness the right kidney is 0.9 cm and left kidney is 0.8 cm. There is no apparent hydronephrosis or renal mass. The retroperitoneal area appears unremarkable. No evidence of aneurysmal dilatation of the aorta is seen. The urinary bladder appears to be normal .There is no mass or debris seen. NORMAL RENAL SONOGRAM.. There is suspicion for possible dilated ureter. I would recommend noncontrast CT of the abdomen and pelvis for further evaluation    ECHO 2D WO COLOR DOPPLER COMPLETE    Result Date: 10/7/2022  Transthoracic Echocardiography Report (TTE)  Demographics   Patient Name   Alejandra Howell  Date of Study           10/07/2022   MRN            [id number]        Gender                  Female   Date of Birth  1950    Room Number MHL-0148   Age            70 year(s)   Height:        64 inches     Referring Physician     Kathy Mora   Weight:        155 pounds    Sonographer             Yesenia Milan   BSA:           1.76 m^2      Interpreting Physician  Delinda Aschoff   BMI:           26.61 kg/m^2  Procedure Type of Study   TTE procedure:ECHO 2D W/DOPPLER/COLOR/CONTRAST. Study Location: Portable Technical Quality: Limited visualization due to poor acoustical window. Patient Status: Inpatient Contrast Medium: Definity. HR: 98 bpm BP: 74/52 mmHg Indications:Dyspnea/SOB. Conclusions   Summary  Normal left ventricular size with normal LV function and an estimated  ejection fraction of approximately 70-75%. No regional wall motion  abnormalities. Normal left ventricular wall thickness. Grade II diastolic  dysfunction with evidence for increased LVEDP. Normal right ventricular size with preserved RV function (TAPSE 28 mm). Normal bi-atrial size. Mild mitral valve stenosis (mean gradient 5 mmHg at  bpm). Mild tricuspid regurgitation with estimated RVSP of 43 mm Hg, suggestive  for mild pulmonary hypertension. Aortic root and ascending aorta are within normal limits. No evidence of significant pericardial effusion is noted. The rhythm is sinus. Hemodynamic parameters: Normal SVI 40 ml/m2 and CI 3.9 L/min/m2 (average  HR 98 bpm). No previous studies. Signature   ----------------------------------------------------------------  Electronically signed by Gato Barger(Interpreting physician)  on 10/07/2022 09:00 AM  ----------------------------------------------------------------   Findings   Mitral Valve  Mildly sclerosed posterior annulus and leaflet of the mitral valve with  mildly reduced leaflet mobility. Mild mitral valve stenosis (mean gradient  5 mmHg at  bpm). Trivial mitral regurgitation. Aortic Valve  Aortic valve appears to be tricuspid. Structurally normal aortic valve.   No significant aortic regurgitation or stenosis is noted. Tricuspid Valve  Tricuspid valve is structurally normal.  Mild tricuspid regurgitation with estimated RVSP of 43 mm Hg, suggestive  for mild pulmonary hypertension. Pulmonic Valve  The pulmonic valve was not well visualized. No evidence of pulmonic stenosis. No evidence of pulmonic regurgitation. Left Atrium  Normal size left atrium. Left Ventricle  Normal left ventricular size with normal LV function and an estimated  ejection fraction of approximately 70-75%. No regional wall motion abnormalities. Normal left ventricular wall thickness. No evidence of left ventricular mass or thrombus noted. Grade II diastolic dysfunction with evidence for increased LVEDP. Right Atrium  Normal right atrial dimension with no evidence of thrombus or mass noted. Right Ventricle  Normal right ventricular size with preserved RV function (TAPSE 28 mm). Pericardial Effusion  No evidence of significant pericardial effusion is noted. Pleural Effusion  No evidence of pleural effusion. Miscellaneous  Aortic root and ascending aorta are within normal limits. The IVC is normal.  Doppler of the pulmonary veins shows normal flow. Definity contrast was utilized to enhance the endocardial borders. The rhythm is sinus. Hemodynamic parameters: SVI 40 ml/m2 and CI 3.9 L/min/m2 (average HR 98  bpm). 2D Measurements and Calculations(cm)   LVIDd: 4.55 cm                      LVIDs: 3.28 cm  IVSd: 0.98 cm  LVPWd: 0.97 cm                      AO Root Dimension: 2.2 cm  Rt. Vent.  Dimension: 2.44 cm        LA Dimension: 3.1 cm  % Ejection Fraction: 55 %           LA Area: 12.1 cm^2  LA Volume: 22.5 ml                  LV Systolic dimension: 7.17HM  LA Volume Index: 13 ml/m^2          LV PW diastolic: 9.91UQ  LV dimension: 4.55 cm               LVOT diameter: 2 cm                                      RA Systolic pressure: 3mmHg  LVEDV: 85.1 ml                      RV Diastolic dimension: 7.65ZU  LVESV:25.8 ml                       LVEDVI: 48 ml/m^2  Cardic Output (CO): 6.83l/min       LVESVI: 15 ml/m^2  Ascending Aorta: 2.5 cm  Doppler Measurements and Calculations   AV Peak Velocity:196 cm/s              MV Peak E-Wave: 104 cm/s  AV Mean Velocity:130 cm/s              MV Peak A-Wave: 115 cm/s  AV Peak Gradient: 15.37 mmHg           MV E/A Ratio: 0.9 %  AV Mean Gradient: 8 mmHg               MV Mean velocity: 105cm/s  AV Area (Continuity):1.83 cm^2         MV Peak Gradient: 4.33 mmHg  AV VTI:38 cm/s                         MV Mean gradient: 5 mmHg  TR Velocity:297 cm/s                   MV P1/2t: 74 msec  TR Gradient:35.28 mmHg                 MVA by PHT2.97 cm^2  Estimated RAP:8 mmHg  RVSP:43 mmHg   MV E' septal velocity: 5.77cm/s  MV E' lateral velocity:8.38 cm/s       Assessment and Plan:   Septic shock  Likely aspiration pneumonia versus CAP  Streptococcus bacteremia  Enterococcus faecium UTI  Acute hypoxemic respiratory failure  YESICA/CKD unknown stage  Elevated D-dimer     Broad-spectrum antibiotics while awaiting finalization of cultures  IVF  Since weaned off norepinephrine gtt  Lactate cleared  No mention of endocarditis per TTE  JULIO CÉSAR only if warranted  Follow repeat blood cultures  V/Q would likely be low yield given abnormal CXR  CT angio contraindicated given YESICA/rising creatinine  No evidence of RV strain per TTE  Wells score of 1.5 correlates with low risk for PE  Respiratory status continues to improve with current treatment plan for above issues  Renal following YESICA  CT ordered to further eval possible dilated ureter on renal ultrasound  Renally dosed Lovenox for DVT prophylaxis  Discussed treatment plan with patient//RN    Total critical care time: 45 minutes    Cheryl Potts MD   10/9/2022 11:20 AM

## 2022-10-10 LAB
ANION GAP SERPL CALCULATED.3IONS-SCNC: 10 MMOL/L (ref 7–19)
BASOPHILS ABSOLUTE: 0 K/UL (ref 0–0.2)
BASOPHILS RELATIVE PERCENT: 0.2 % (ref 0–1)
BUN BLDV-MCNC: 46 MG/DL (ref 8–23)
CALCIUM SERPL-MCNC: 8.3 MG/DL (ref 8.8–10.2)
CHLORIDE BLD-SCNC: 122 MMOL/L (ref 98–111)
CO2: 17 MMOL/L (ref 22–29)
CREAT SERPL-MCNC: 2.6 MG/DL (ref 0.5–0.9)
EKG P AXIS: 65 DEGREES
EKG P-R INTERVAL: 130 MS
EKG Q-T INTERVAL: 328 MS
EKG QRS DURATION: 90 MS
EKG QTC CALCULATION (BAZETT): 427 MS
EKG T AXIS: 98 DEGREES
EOSINOPHILS ABSOLUTE: 0.4 K/UL (ref 0–0.6)
EOSINOPHILS RELATIVE PERCENT: 2.1 % (ref 0–5)
GFR AFRICAN AMERICAN: 22
GFR NON-AFRICAN AMERICAN: 18
GLUCOSE BLD-MCNC: 131 MG/DL (ref 70–99)
GLUCOSE BLD-MCNC: 150 MG/DL (ref 74–109)
GLUCOSE BLD-MCNC: 173 MG/DL (ref 70–99)
GLUCOSE BLD-MCNC: 219 MG/DL (ref 70–99)
GLUCOSE BLD-MCNC: 311 MG/DL (ref 70–99)
HCT VFR BLD CALC: 29.1 % (ref 37–47)
HEMOGLOBIN: 9.3 G/DL (ref 12–16)
IMMATURE GRANULOCYTES #: 0.2 K/UL
LYMPHOCYTES ABSOLUTE: 1 K/UL (ref 1.1–4.5)
LYMPHOCYTES RELATIVE PERCENT: 5.7 % (ref 20–40)
Lab: NORMAL
MAGNESIUM: 1.5 MG/DL (ref 1.6–2.4)
MCH RBC QN AUTO: 33.2 PG (ref 27–31)
MCHC RBC AUTO-ENTMCNC: 32 G/DL (ref 33–37)
MCV RBC AUTO: 103.9 FL (ref 81–99)
MONOCYTES ABSOLUTE: 0.8 K/UL (ref 0–0.9)
MONOCYTES RELATIVE PERCENT: 4.6 % (ref 0–10)
NEUTROPHILS ABSOLUTE: 14.9 K/UL (ref 1.5–7.5)
NEUTROPHILS RELATIVE PERCENT: 86.1 % (ref 50–65)
PDW BLD-RTO: 15.9 % (ref 11.5–14.5)
PERFORMED ON: ABNORMAL
PLATELET # BLD: 121 K/UL (ref 130–400)
PMV BLD AUTO: 10.4 FL (ref 9.4–12.3)
POTASSIUM SERPL-SCNC: 4.5 MMOL/L (ref 3.5–5)
RBC # BLD: 2.8 M/UL (ref 4.2–5.4)
REPORT: NORMAL
SODIUM BLD-SCNC: 149 MMOL/L (ref 136–145)
THIS TEST SENT TO: NORMAL
VANCOMYCIN TROUGH: 18.5 UG/ML (ref 10–20)
WBC # BLD: 17.3 K/UL (ref 4.8–10.8)

## 2022-10-10 PROCEDURE — 2580000003 HC RX 258: Performed by: INTERNAL MEDICINE

## 2022-10-10 PROCEDURE — 2580000003 HC RX 258: Performed by: HOSPITALIST

## 2022-10-10 PROCEDURE — 80048 BASIC METABOLIC PNL TOTAL CA: CPT

## 2022-10-10 PROCEDURE — 83735 ASSAY OF MAGNESIUM: CPT

## 2022-10-10 PROCEDURE — 6360000002 HC RX W HCPCS: Performed by: INTERNAL MEDICINE

## 2022-10-10 PROCEDURE — 94640 AIRWAY INHALATION TREATMENT: CPT

## 2022-10-10 PROCEDURE — 93010 ELECTROCARDIOGRAM REPORT: CPT | Performed by: INTERNAL MEDICINE

## 2022-10-10 PROCEDURE — 94761 N-INVAS EAR/PLS OXIMETRY MLT: CPT

## 2022-10-10 PROCEDURE — 2700000000 HC OXYGEN THERAPY PER DAY

## 2022-10-10 PROCEDURE — 85025 COMPLETE CBC W/AUTO DIFF WBC: CPT

## 2022-10-10 PROCEDURE — 1210000000 HC MED SURG R&B

## 2022-10-10 PROCEDURE — 6370000000 HC RX 637 (ALT 250 FOR IP): Performed by: HOSPITALIST

## 2022-10-10 PROCEDURE — 80202 ASSAY OF VANCOMYCIN: CPT

## 2022-10-10 PROCEDURE — 82947 ASSAY GLUCOSE BLOOD QUANT: CPT

## 2022-10-10 PROCEDURE — 6360000002 HC RX W HCPCS: Performed by: HOSPITALIST

## 2022-10-10 RX ORDER — MAGNESIUM SULFATE 1 G/100ML
1000 INJECTION INTRAVENOUS ONCE
Status: COMPLETED | OUTPATIENT
Start: 2022-10-10 | End: 2022-10-10

## 2022-10-10 RX ADMIN — INSULIN LISPRO 2 UNITS: 100 INJECTION, SOLUTION INTRAVENOUS; SUBCUTANEOUS at 13:25

## 2022-10-10 RX ADMIN — SODIUM BICARBONATE 650 MG: 650 TABLET ORAL at 20:09

## 2022-10-10 RX ADMIN — SODIUM BICARBONATE 650 MG: 650 TABLET ORAL at 10:18

## 2022-10-10 RX ADMIN — GUAIFENESIN 600 MG: 600 TABLET ORAL at 10:18

## 2022-10-10 RX ADMIN — INSULIN GLARGINE 10 UNITS: 100 INJECTION, SOLUTION SUBCUTANEOUS at 20:13

## 2022-10-10 RX ADMIN — ACETYLCYSTEINE 600 MG: 200 SOLUTION ORAL; RESPIRATORY (INHALATION) at 18:55

## 2022-10-10 RX ADMIN — INSULIN GLARGINE 10 UNITS: 100 INJECTION, SOLUTION SUBCUTANEOUS at 10:18

## 2022-10-10 RX ADMIN — IPRATROPIUM BROMIDE AND ALBUTEROL SULFATE 1 AMPULE: 2.5; .5 SOLUTION RESPIRATORY (INHALATION) at 07:12

## 2022-10-10 RX ADMIN — IPRATROPIUM BROMIDE AND ALBUTEROL SULFATE 1 AMPULE: 2.5; .5 SOLUTION RESPIRATORY (INHALATION) at 14:35

## 2022-10-10 RX ADMIN — SODIUM BICARBONATE 650 MG: 650 TABLET ORAL at 13:25

## 2022-10-10 RX ADMIN — SODIUM BICARBONATE 650 MG: 650 TABLET ORAL at 17:43

## 2022-10-10 RX ADMIN — IPRATROPIUM BROMIDE AND ALBUTEROL SULFATE 1 AMPULE: 2.5; .5 SOLUTION RESPIRATORY (INHALATION) at 18:56

## 2022-10-10 RX ADMIN — SODIUM CHLORIDE, PRESERVATIVE FREE 10 ML: 5 INJECTION INTRAVENOUS at 20:10

## 2022-10-10 RX ADMIN — AZITHROMYCIN DIHYDRATE 500 MG: 500 INJECTION, POWDER, LYOPHILIZED, FOR SOLUTION INTRAVENOUS at 17:42

## 2022-10-10 RX ADMIN — INSULIN LISPRO 6 UNITS: 100 INJECTION, SOLUTION INTRAVENOUS; SUBCUTANEOUS at 17:42

## 2022-10-10 RX ADMIN — GUAIFENESIN 600 MG: 600 TABLET ORAL at 20:09

## 2022-10-10 RX ADMIN — MAGNESIUM SULFATE HEPTAHYDRATE 1000 MG: 1 INJECTION, SOLUTION INTRAVENOUS at 14:13

## 2022-10-10 RX ADMIN — WATER 1000 MG: 1 INJECTION INTRAMUSCULAR; INTRAVENOUS; SUBCUTANEOUS at 13:25

## 2022-10-10 RX ADMIN — VANCOMYCIN HYDROCHLORIDE 750 MG: 750 INJECTION, POWDER, LYOPHILIZED, FOR SOLUTION INTRAVENOUS at 05:29

## 2022-10-10 RX ADMIN — IPRATROPIUM BROMIDE AND ALBUTEROL SULFATE 1 AMPULE: 2.5; .5 SOLUTION RESPIRATORY (INHALATION) at 10:52

## 2022-10-10 RX ADMIN — ACETYLCYSTEINE 600 MG: 200 SOLUTION ORAL; RESPIRATORY (INHALATION) at 07:12

## 2022-10-10 RX ADMIN — ENOXAPARIN SODIUM 30 MG: 100 INJECTION SUBCUTANEOUS at 10:18

## 2022-10-10 ASSESSMENT — PAIN SCALES - GENERAL
PAINLEVEL_OUTOF10: 0
PAINLEVEL_OUTOF10: 0
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
## 2022-10-10 NOTE — PROGRESS NOTES
Nephrology ([facility]) progress note      Patient:  Jing Swain  YOB: 1950  Date of Service: 10/10/2022  MRN: [id number]   Acct: [de-identified]   Primary Care Physician: No primary care provider on file. Advance Directive: Full Code  Admit Date: 10/6/2022       Hospital Day: 4  Referring Provider: Keyur Haywood MD    Patient independently seen and examined, Chart, Consults, Notes, Operative notes, Labs, Cardiology, and Radiology studies reviewed as available. Subjective:  Jing Swain is a 70 y.o. female for whom we were consulted for evaluation and treatment of acute on chronic kidney disease. Patient recalls following with nephrologist in [place] for chronic kidney disease that she reported as near end-stage. She is unable to quantify the GFR or creatinine. She was admitted for nausea, vomiting, cystitis. She had multiple questions about her diabetes management. She denied hematuria, hemoptysis, rash, chest pain, shortness of air at rest, nausea or vomiting. She had refused Kayexalate. She tolerated Lokelma without issue with subsequent improvement in potassium. Up in chair and feeling better. Denied chest pain, shortness of air at rest, nausea or vomiting. She was anxious for discharge. Today, no overnight events.       Allergies:  Pcn [penicillins]    Medicines:  Current Facility-Administered Medications   Medication Dose Route Frequency Provider Last Rate Last Admin    magnesium sulfate 1000 mg in dextrose 5% 100 mL IVPB  1,000 mg IntraVENous Once Keyur Haywood MD        sodium bicarbonate tablet 650 mg  650 mg Oral 4x Daily Pawan Coronado MD   650 mg at 10/10/22 1018    insulin lispro (HUMALOG) injection vial 0-8 Units  0-8 Units SubCUTAneous TID  Pawan Coronado MD   2 Units at 10/09/22 1732    insulin lispro (HUMALOG) injection vial 0-4 Units  0-4 Units SubCUTAneous Nightly Pawan Coronado MD        cefTRIAXone (ROCEPHIN) 1,000 mg in sterile water 10 mL IV syringe  1,000 mg IntraVENous Q24H Dayna Amezcua MD   1,000 mg at 10/09/22 1248    0.9 % sodium chloride infusion   IntraVENous Continuous Dayna Amezcua  mL/hr at 10/09/22 2007 New Bag at 10/09/22 2007    azithromycin (ZITHROMAX) 500 mg in sodium chloride 0.9 % 250 mL IVPB ([id number])  500 mg IntraVENous Q24H Dayna Amezcua MD   Stopped at 10/09/22 1700    vancomycin (VANCOCIN) intermittent dosing (placeholder)   Other RX Placeholder Dayna Amezcua MD        insulin glargine (LANTUS) injection vial 10 Units  10 Units SubCUTAneous BID Ilene Santiago MD   10 Units at 10/09/22 2150    polyethylene glycol (GLYCOLAX) packet 17 g  17 g Oral Daily Ilene Santiago MD        sennosides-docusate sodium (SENOKOT-S) 8.6-50 MG tablet 2 tablet  2 tablet Oral Daily Ilene Santiago MD        glucose chewable tablet 16 g  4 tablet Oral PRN Carine King MD        dextrose bolus 10% 125 mL  125 mL IntraVENous PRN Carine King MD        Or    dextrose bolus 10% 250 mL  250 mL IntraVENous PRN Carine King MD        glucagon (rDNA) injection 1 mg  1 mg SubCUTAneous PRN Carine King MD        dextrose 10 % infusion   IntraVENous Continuous PRN Carine King MD        sodium chloride flush 0.9 % injection 5-40 mL  5-40 mL IntraVENous 2 times per day Ilene Santiago MD   10 mL at 10/09/22 0932    sodium chloride flush 0.9 % injection 10 mL  10 mL IntraVENous PRN Ilene Santiago MD        0.9 % sodium chloride infusion   IntraVENous PRN Ilene Santiago MD        enoxaparin Sodium (LOVENOX) injection 30 mg  30 mg SubCUTAneous Daily Ilene Santiago MD   30 mg at 10/10/22 1018    ondansetron (ZOFRAN-ODT) disintegrating tablet 4 mg  4 mg Oral Q8H PRN Ilene Santiago MD        Or    ondansetron [facility]) injection 4 mg  4 mg IntraVENous Q6H PRN Ilene Santiago MD        polyethylene glycol (GLYCOLAX) packet 17 g  17 g Oral Daily PRN Bianca Ruano MD        acetaminophen (TYLENOL) tablet 650 mg  650 mg Oral Q6H PRN Bianca Ruaon MD        Or    acetaminophen (TYLENOL) suppository 650 mg  650 mg Rectal Q6H PRN Bianca Ruano MD        ipratropium-albuterol (DUONEB) nebulizer solution 1 ampule  1 ampule Inhalation Q4H WA Bianca Ruano MD   1 ampule at 10/10/22 1052    acetylcysteine (MUCOMYST) 20 % solution 600 mg  600 mg Inhalation BID Bianca Ruano MD   600 mg at 10/10/22 1492    guaiFENesin [facility]) extended release tablet 600 mg  600 mg Oral BID Bianca Ruano MD   600 mg at 10/10/22 1018       Past Medical History:  Past Medical History:   Diagnosis Date    Chronic kidney disease     stage 4 not receiving dialysis    Diabetes mellitus ([facility] [address]     type 1 dx 1963    Gallstones     Hypertension     Lactose intolerance     Osteopenia     UTI (urinary tract infection)     in ICU in April with UTI       Past Surgical History:  Past Surgical History:   Procedure Laterality Date    CARPAL TUNNEL RELEASE Bilateral     CATARACT REMOVAL      FRACTURE SURGERY         Family History  History reviewed. No pertinent family history.     Social History  Social History     Socioeconomic History    Marital status:      Spouse name: Not on file    Number of children: Not on file    Years of education: Not on file    Highest education level: Not on file   Occupational History    Not on file   Tobacco Use    Smoking status: Never    Smokeless tobacco: Never   Substance and Sexual Activity    Alcohol use: Never    Drug use: Never    Sexual activity: Not on file   Other Topics Concern    Not on file   Social History Narrative    Not on file     Social Determinants of Health     Financial Resource Strain: Not on file   Food Insecurity: Not on file   Transportation Needs: Not on file   Physical Activity: Not on file   Stress: Not on file   Social Connections: Not on file   Intimate Partner Violence: Not on file   Housing Stability: Not on file         Review of Systems:  History obtained from chart review and the patient  General ROS: No fever or chills  Respiratory ROS: No cough, shortness of breath, wheezing  Cardiovascular ROS: No chest pain or palpitations  Gastrointestinal ROS: No abdominal pain or melena  Genito-Urinary ROS: No dysuria or hematuria  Musculoskeletal ROS: No joint pain or swelling   14 point ROS reviewed with the patient and negative except as noted above and in the HPI unless unable to obtain.     Objective:  Patient Vitals for the past 24 hrs:   BP Temp Temp src Pulse Resp SpO2   10/10/22 0915 -- -- -- -- -- 95 %   10/10/22 0724 127/61 98.1 °F (36.7 °C) Temporal 86 17 100 %   10/10/22 0712 -- -- -- -- -- 95 %   10/10/22 0529 -- -- -- -- -- 95 %   10/10/22 0525 -- -- -- -- -- 96 %   10/10/22 0514 108/68 97 °F (36.1 °C) -- 90 18 94 %   10/09/22 2015 109/61 98.4 °F (36.9 °C) -- (!) 109 18 96 %   10/09/22 1904 -- -- -- -- -- 96 %   10/09/22 1901 -- -- -- -- -- 96 %   10/09/22 1646 (!) 150/68 98.6 °F (37 °C) Temporal (!) 111 14 91 %   10/09/22 1534 (!) 135/57 98.2 °F (36.8 °C) Temporal (!) 110 18 94 %   10/09/22 1300 (!) 142/92 -- -- (!) 104 15 91 %       No intake or output data in the 24 hours ending 10/10/22 1244    General: awake/alert   Chest:  clear to auscultation bilaterally  CVS: regular rate and rhythm  Abdominal: soft, nontender, normal bowel sounds  Extremities: no cyanosis or edema  Skin: warm and dry without rash      Labs:  BMP:   Recent Labs     10/08/22  0248 10/08/22  0732 10/09/22  0245 10/10/22  0300   *  --  142 149*   K 6.4*  --  4.8 4.5     --  116* 122*   CO2 14*  --  17* 17*   PHOS  --  3.5 3.0  --    BUN 63*  --  60* 46*   CREATININE 2.9*  --  2.9* 2.6*   CALCIUM 8.0*  --  8.2* 8.3*       CBC:   Recent Labs     10/08/22  0248 10/09/22  0245 10/10/22  0300   WBC 25.0* 22.1* 17.3*   HGB 10.1* 9.0* 9.3*   HCT 31.4* 27.8* 29.1*   .6* 102.2* 103.9*    121* 121*       LIVER PROFILE:   No results for input(s): AST, ALT, LIPASE, BILIDIR, BILITOT, ALKPHOS in the last 72 hours. Invalid input(s): AMYLASE,  ALB    PT/INR: No results for input(s): PROTIME, INR in the last 72 hours. APTT: No results for input(s): APTT in the last 72 hours. BNP:  No results for input(s): BNP in the last 72 hours. Ionized Calcium:No results for input(s): IONCA in the last 72 hours. Magnesium:  Recent Labs     10/08/22  0248 10/09/22  0245 10/10/22  0300   MG 1.6 1.7 1.5*       Phosphorus:  Recent Labs     10/08/22  0732 10/09/22  0245   PHOS 3.5 3.0       HgbA1C: No results for input(s): LABA1C in the last 72 hours. Hepatic:   No results for input(s): ALKPHOS, ALT, AST, PROT, BILITOT, BILIDIR, LABALBU in the last 72 hours. Lactic Acid:   No results for input(s): LACTA in the last 72 hours. Troponin: No results for input(s): CKTOTAL, CKMB, TROPONINT in the last 72 hours. ABGs: No results for input(s): PH, PCO2, PO2, HCO3, O2SAT in the last 72 hours. CRP:  No results for input(s): CRP in the last 72 hours. Sed Rate:  No results for input(s): SEDRATE in the last 72 hours. Cultures:   No results for input(s): CULTURE in the last 72 hours. No results for input(s): BC, Barnetta Fidencio in the last 72 hours. No results for input(s): CXSURG in the last 72 hours. Radiology reports as per the Radiologist  Radiology: XR ABDOMEN (KUB) (SINGLE AP VIEW)    Result Date: 10/7/2022  Clinical history: Abdominal pain Finding: The bowel gas pattern is normal.There is fecal stasis suggesting of constipation. No evidence of organomegaly or abnormal calcifications is seen. The osseous structures of the abdomen and pelvis appear to be normal.There is mild atherosclerotic change of both iliac arteries with calcified plaque. There is a catheter seen in the pelvic region. .    Non-specific gas pattern.  Fecal stasis suggesting of constipation    XR CHEST PORTABLE    Result Date: 10/7/2022  Patient: Dario Miller  Time Out: 02:24Exam(s): FILM CXR 1 VIEW EXAM:  XR Chest, 1 ViewCLINICAL HISTORY:   Reason for exam: central line placement. TECHNIQUE:  Frontal view of the chest.COMPARISON:  10/6/2022    Right central line in the upper SVCElectronically signed by Liss Evans MD on 10-07-22 at 0224    XR CHEST PORTABLE    Result Date: 10/6/2022  Patient: Dario Miller  Time Out: 23:43Exam(s): FILM CXR 1 VIEW EXAM:  XR Chest, 1 ViewCLINICAL HISTORY:   Reason for exam: shortness of breath. TECHNIQUE:  Frontal view of the chest.COMPARISON:  No relevant prior studies available. FINDINGS:  Lungs:  Airspace opacities within the mid to lower lungs likely infectious. Pleural space:  Unremarkable. Heart:  Unremarkable. Mediastinum:  Unremarkable. Bones/joints:  Unremarkable. Airspace opacities within the mid to lower lungs likely infectious. Electronically signed by Leobardo Ybarra MD on 10-06-22 at 2343    ECHO 2D [address]    Result Date: 10/7/2022  Transthoracic Echocardiography Report (TTE)  Demographics   Patient Name   Dario Miller  Date of Study           10/07/2022   MRN            [id number]        Gender                  Female   Date of Birth  1950    Room Number             [id number]   Age            70 year(s)   Height:        64 inches     Referring Physician     Atif Wilson   Weight:        155 pounds    Sonographer             Yesenia Milan   BSA:           1.76 m^2      Interpreting Physician  Tamar Shearer   BMI:           26.61 kg/m^2  Procedure Type of Study   TTE procedure:ECHO 2D W/DOPPLER/COLOR/CONTRAST. Study Location: Portable Technical Quality: Limited visualization due to poor acoustical window. Patient Status: Inpatient Contrast Medium: Definity. HR: 98 bpm BP: 74/52 mmHg Indications:Dyspnea/SOB. Conclusions   Summary  Normal left ventricular size with normal LV function and an estimated  ejection fraction of approximately 70-75%.  No regional wall motion  abnormalities. Normal left ventricular wall thickness. Grade II diastolic  dysfunction with evidence for increased LVEDP. Normal right ventricular size with preserved RV function (TAPSE 28 mm). Normal bi-atrial size. Mild mitral valve stenosis (mean gradient 5 mmHg at  bpm). Mild tricuspid regurgitation with estimated RVSP of 43 mm Hg, suggestive  for mild pulmonary hypertension. Aortic root and ascending aorta are within normal limits. No evidence of significant pericardial effusion is noted. The rhythm is sinus. Hemodynamic parameters: Normal SVI 40 ml/m2 and CI 3.9 L/min/m2 (average  HR 98 bpm). No previous studies. Signature   ----------------------------------------------------------------  Electronically signed by Shyann LawsonInterpreting physician)  on 10/07/2022 09:00 AM  ----------------------------------------------------------------   Findings   Mitral Valve  Mildly sclerosed posterior annulus and leaflet of the mitral valve with  mildly reduced leaflet mobility. Mild mitral valve stenosis (mean gradient  5 mmHg at  bpm). Trivial mitral regurgitation. Aortic Valve  Aortic valve appears to be tricuspid. Structurally normal aortic valve. No significant aortic regurgitation or stenosis is noted. Tricuspid Valve  Tricuspid valve is structurally normal.  Mild tricuspid regurgitation with estimated RVSP of 43 mm Hg, suggestive  for mild pulmonary hypertension. Pulmonic Valve  The pulmonic valve was not well visualized. No evidence of pulmonic stenosis. No evidence of pulmonic regurgitation. Left Atrium  Normal size left atrium. Left Ventricle  Normal left ventricular size with normal LV function and an estimated  ejection fraction of approximately 70-75%. No regional wall motion abnormalities. Normal left ventricular wall thickness. No evidence of left ventricular mass or thrombus noted. Grade II diastolic dysfunction with evidence for increased LVEDP. Right Atrium  Normal right atrial dimension with no evidence of thrombus or mass noted. Right Ventricle  Normal right ventricular size with preserved RV function (TAPSE 28 mm). Pericardial Effusion  No evidence of significant pericardial effusion is noted. Pleural Effusion  No evidence of pleural effusion. Miscellaneous  Aortic root and ascending aorta are within normal limits. The IVC is normal.  Doppler of the pulmonary veins shows normal flow. Definity contrast was utilized to enhance the endocardial borders. The rhythm is sinus. Hemodynamic parameters: SVI 40 ml/m2 and CI 3.9 L/min/m2 (average HR 98  bpm). 2D Measurements and Calculations(cm)   LVIDd: 4.55 cm                      LVIDs: 3.28 cm  IVSd: 0.98 cm  LVPWd: 0.97 cm                      AO Root Dimension: 2.2 cm  Rt. Vent.  Dimension: 2.44 cm        LA Dimension: 3.1 cm  % Ejection Fraction: 55 %           LA Area: 12.1 cm^2  LA Volume: 22.5 ml                  LV Systolic dimension: 8.24YO  LA Volume Index: 13 ml/m^2          LV PW diastolic: 6.33GU  LV dimension: 4.55 cm               LVOT diameter: 2 cm                                      RA Systolic pressure: 3mmHg  LVEDV: 85.1 ml                      RV Diastolic dimension: 7.65LQ  LVESV:25.8 ml                       LVEDVI: 48 ml/m^2  Cardic Output (CO): 6.83l/min       LVESVI: 15 ml/m^2  Ascending Aorta: 2.5 cm  Doppler Measurements and Calculations   AV Peak Velocity:196 cm/s              MV Peak E-Wave: 104 cm/s  AV Mean Velocity:130 cm/s              MV Peak A-Wave: 115 cm/s  AV Peak Gradient: 15.37 mmHg           MV E/A Ratio: 0.9 %  AV Mean Gradient: 8 mmHg               MV Mean velocity: 105cm/s  AV Area (Continuity):1.83 cm^2         MV Peak Gradient: 4.33 mmHg  AV VTI:38 cm/s                         MV Mean gradient: 5 mmHg  TR Velocity:297 cm/s                   MV P1/2t: 74 msec  TR Gradient:35.28 mmHg                 MVA by PHT2.97 cm^2  Estimated RAP:8 mmHg  RVSP:43 mmHg MV E' septal velocity: 5.77cm/s  MV E' lateral velocity:8.38 cm/s         Assessment   -Acute kidney injury-ATN  -Chronic kidney disease stage IV with baseline creatinine for about a month ago 2.4  -Hyperkalemia  -Acute cystitis  -Septic shock  -Diabetes type 1 with renal disease    Plan:  Discussed with patient  Work-up reviewed todate  Monitor labs  Potassium better with Lokelma, redose as needed  Continue oral alkali   Antibiotics per primary service  Hold IV saline and increase water intake    Johnna Cooks, MD  10/10/22  12:44 PM
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
## 2022-10-10 NOTE — PROGRESS NOTES
[facility] Pharmacokinetic Monitoring Service - Vancomycin    Consulting Provider: Dr Phu Shaikh   Indication: Sepsis  Target Concentration: Dosing based on anticipated concentration <15 mg/L due to renal impairment/insufficiency  Day of Therapy: 4  Additional Antimicrobials: Zithromax and Rocephin    Pertinent Laboratory Values: Wt Readings from Last 1 Encounters:   10/09/22 161 lb (73 kg)     Temp Readings from Last 1 Encounters:   10/09/22 98.4 °F (36.9 °C)     Estimated Creatinine Clearance: 19 mL/min (A) (based on SCr of 2.6 mg/dL (H)). Recent Labs     10/09/22  0245 10/10/22  0300   CREATININE 2.9* 2.6*   WBC 22.1* 17.3*     Procalcitonin: 10/06= 0.48    Pertinent Cultures:  Culture Date Source Results   10/07/22 Blood Gram+ cocci  Staph alactolyticus   10/07/22 Urine Enterococcus faecium   MRSA Nasal Swab: N/A. Non-respiratory infection.     Recent vancomycin administrations                     vancomycin (VANCOCIN) 750 mg in sodium chloride 0.9 % 250 mL IVPB (mg) 750 mg New Bag 10/09/22 0516    vancomycin (VANCOCIN) 1000 mg in sodium chloride 0.9% 250 mL IVPB (mg) 1,000 mg New Bag 10/08/22 0445    vancomycin (VANCOCIN) 1500 mg in dextrose 5% 500 mL IVPB (mg) 1,500 mg New Bag 10/07/22 0627                    Assessment:  Date/Time Current Dose Concentration Timing of Concentration (h) AUC   10/10/22 Pulse dosing 18.5 Random level     Note: Serum concentrations collected for AUC dosing may appear elevated if collected in close proximity to the dose administered, this is not necessarily an indication of toxicity    Plan:  Current dosing regimen is therapeutic  Give Vancomycin 750mg x1 dose today with random level tomorrow morning  Repeat vancomycin concentration ordered for 10/11 @ 0300   Pharmacy will continue to monitor patient and adjust therapy as indicated    Thank you for the consult,  Chery Goldberg, [facility]  10/10/2022 5:25 AM

## 2022-10-10 NOTE — CARE COORDINATION
Patient Contact Information:  [facility] [id number] [phone] (home)   Above information verified? [x]   Yes  []   No    Have you been vaccinated for COVID-19 (SARS-CoV-2)? [x]   Yes  []   No                   If so, when? Which :  [x]   [id number]  []   Moderna  []   Jayme Perez  []   Other:       Pharmacy:    [facility] [address] [place] [address], [address] [place] [address] [address]kw[address] Karlee Moss [phone]  [address] [place] [address]  Phone: [phone] Fax: [phone]      Active with HD/PD prior to admission:           []   Yes  [x]   No  HD Center:       Financial:  Payor: Ranjan Sites / Plan: Luis Chavis / Product Type: *No Product type* /     Pre-Cert required for SNF:   [x]   Yes  []   No    Patient Deficits:  []   Yes   [x]   No    If yes:  []   Confusion/Memory  []   Visual  []   Motor/Sensory         []   Right arm         []   Right leg         []   Left arm         []   Left leg  []   Language/Speech         []   Aphasia         []   Dysarthria         []   Swallow         [place] Coma Scale  Eye Opening: Spontaneous  Best Verbal Response: Oriented  Best Motor Response: Obeys commands  [place] Coma Scale Score: 15    Patient Deficit Notes: Additional CM/SW Notes:   Patient lives in [place], [place] with her spouse. She is currently traveling by boat to complete the [place]. She and her spouse were on their way back to [place], [place]. Their boat is docked at [place]. She is hopeful to be weaned from oxygen. If she is not weaned from oxygen, she will discharge home. Otherwise, she plans to continue the [place]. Patient uses a cane PRN and does not expect to need any additional DME. If home oxygen is needed, it can be supplied by Janina Rios. She is not interested in [address] [place] [address] Vincent Rick, \"absolutely not\". CM will continue to follow.     Marion Gomez RN  [facility]     10/10/22 3311   Service Assessment   Patient Orientation Alert and Oriented;Person;Place;Situation   Cognition Alert   History Provided By Patient   Primary Caregiver Self   Support Systems Spouse/Significant Other   PCP Verified by CM Yes  (sees Dr. Shilpi Parker in [place], [place])   Prior Functional Level Independent in ADLs/IADLs   Current Functional Level Independent in ADLs/IADLs   Can patient return to prior living arrangement Unknown at present   Ability to make needs known: Good   Family able to assist with home care needs: Yes   Social/Functional History   Lives With Spouse   Type of Home   (currently traveling on boat)   [address]  (uses PRN)   ADL Assistance Independent   Homemaking Assistance Independent   Ambulation Assistance Independent   Transfer Assistance Independent   Active  Yes   Discharge Planning   Type of [facility]  (currently traveling on boat)   Living Arrangements Spouse/Significant Other   Current Services Prior To Admission None   Potential [facility] DME Needed Oxygen Therapy (Comment)   DME Ordered? No   Potential Assistance Purchasing Medications No   Type of Home Care Services None   Patient expects to be discharged to: Unknown  (depends on if weaned from oxygen)   Services At/After Discharge   Services At/After Discharge None   Mode of Transport at Discharge Other (see comment)  (spouse)   Condition of Participation: Discharge Planning   The Patient and/or Patient Representative was provided with a Choice of Provider? Patient   The Patient and/Or Patient Representative agree with the Discharge Plan?  Yes

## 2022-10-10 NOTE — PROGRESS NOTES
Hospitalist Progress Note  [facility]     Patient: Christiana Feldman  : 1950  MRN: [id number]  Code Status: Full Code    Hospital Day: 4   Date of Service: 10/10/2022    Subjective:   Patient seen and examined. Continues to feel better. No current complaints. Past Medical History:   Diagnosis Date    Chronic kidney disease     stage 4 not receiving dialysis    Diabetes mellitus ([address]     type 1 dx 1963    Gallstones     Hypertension     Lactose intolerance     Osteopenia     UTI (urinary tract infection)     in ICU in April with UTI       Past Surgical History:   Procedure Laterality Date    CARPAL TUNNEL RELEASE Bilateral     CATARACT REMOVAL      FRACTURE SURGERY         History reviewed. No pertinent family history.     Social History     Socioeconomic History    Marital status:      Spouse name: Not on file    Number of children: Not on file    Years of education: Not on file    Highest education level: Not on file   Occupational History    Not on file   Tobacco Use    Smoking status: Never    Smokeless tobacco: Never   Substance and Sexual Activity    Alcohol use: Never    Drug use: Never    Sexual activity: Not on file   Other Topics Concern    Not on file   Social History Narrative    Not on file     Social Determinants of Health     Financial Resource Strain: Not on file   Food Insecurity: Not on file   Transportation Needs: Not on file   Physical Activity: Not on file   Stress: Not on file   Social Connections: Not on file   Intimate Partner Violence: Not on file   Housing Stability: Not on file       Current Facility-Administered Medications   Medication Dose Route Frequency Provider Last Rate Last Admin    sodium bicarbonate tablet 650 mg  650 mg Oral 4x Daily Wiliam Lovell MD   650 mg at 10/10/22 1018    insulin lispro (HUMALOG) injection vial 0-8 Units  0-8 Units SubCUTAneous TID  Wiliam Lovell MD   2 Units at 10/09/22 1732    insulin lispro (HUMALOG) injection vial 0-4 Units  0-4 Units SubCUTAneous Nightly Chucky Bence, MD        cefTRIAXone (ROCEPHIN) 1,000 mg in sterile water 10 mL IV syringe  1,000 mg IntraVENous Q24H Manny Wade MD   1,000 mg at 10/09/22 1248    0.9 % sodium chloride infusion   IntraVENous Continuous Manny Wade  mL/hr at 10/09/22 2007 New Bag at 10/09/22 2007    azithromycin (ZITHROMAX) 500 mg in sodium chloride 0.9 % 250 mL IVPB ([id number])  500 mg IntraVENous Q24H Manny Wade MD   Stopped at 10/09/22 1700    vancomycin (VANCOCIN) intermittent dosing (placeholder)   Other RX Placeholder Manny Wade MD        insulin glargine (LANTUS) injection vial 10 Units  10 Units SubCUTAneous BID Chucky Bence, MD   10 Units at 10/09/22 2150    polyethylene glycol (GLYCOLAX) packet 17 g  17 g Oral Daily Chucky Bence, MD        sennosides-docusate sodium (SENOKOT-S) 8.6-50 MG tablet 2 tablet  2 tablet Oral Daily Chucky Bence, MD        glucose chewable tablet 16 g  4 tablet Oral PRN Gonsalo Murphy MD        dextrose bolus 10% 125 mL  125 mL IntraVENous PRN Gonsalo Murphy MD        Or    dextrose bolus 10% 250 mL  250 mL IntraVENous PRN Gonsalo Murphy MD        glucagon (rDNA) injection 1 mg  1 mg SubCUTAneous PRN Gonsalo Murphy MD        dextrose 10 % infusion   IntraVENous Continuous PRN Gonsalo Murphy MD        sodium chloride flush 0.9 % injection 5-40 mL  5-40 mL IntraVENous 2 times per day Chucky Bence, MD   10 mL at 10/09/22 0932    sodium chloride flush 0.9 % injection 10 mL  10 mL IntraVENous PRN Chucky Bence, MD        0.9 % sodium chloride infusion   IntraVENous PRN Chucky Bence, MD        enoxaparin Sodium (LOVENOX) injection 30 mg  30 mg SubCUTAneous Daily Chucky Bence, MD   30 mg at 10/10/22 1018    ondansetron (ZOFRAN-ODT) disintegrating tablet 4 mg  4 mg Oral Q8H PRN Chucky Bence, MD        Or    ondansetron [facility]) injection 4 mg  4 mg IntraVENous Q6H PRN Gayle Holder MD        polyethylene glycol (GLYCOLAX) packet 17 g  17 g Oral Daily PRN Gayle Holder MD        acetaminophen (TYLENOL) tablet 650 mg  650 mg Oral Q6H PRN Gayle Holder MD        Or    acetaminophen (TYLENOL) suppository 650 mg  650 mg Rectal Q6H PRN Gayle Holder MD        ipratropium-albuterol (DUONEB) nebulizer solution 1 ampule  1 ampule Inhalation Q4H WA Gayle Holder MD   1 ampule at 10/10/22 1052    acetylcysteine (MUCOMYST) 20 % solution 600 mg  600 mg Inhalation BID Gayle Holder MD   600 mg at 10/10/22 8990    guaiFENesin [facility]) extended release tablet 600 mg  600 mg Oral BID Gayle Holder MD   600 mg at 10/10/22 1018         sodium chloride 100 mL/hr at 10/09/22 2007    dextrose      sodium chloride          Objective:   /61   Pulse 86   Temp 98.1 °F (36.7 °C) (Temporal)   Resp 17   Ht 5' 4\" (1.626 m)   Wt 161 lb (73 kg)   SpO2 95%   BMI 27.64 kg/m²     General: no acute distress  HEENT: normocephalic  Neck: supple, symmetrical, trachea midline   Lungs: improving scattered rhonchi  Cardiovascular: s1 and s2 normal  Abdomen: soft, positive bowel sounds, nondistended, nontender  Extremities: no edema or cyanosis   Neuro: aaox3, no focal deficits    Recent Labs     10/08/22  0248 10/09/22  0245 10/10/22  0300   WBC 25.0* 22.1* 17.3*   RBC 3.03* 2.72* 2.80*   HGB 10.1* 9.0* 9.3*   HCT 31.4* 27.8* 29.1*   .6* 102.2* 103.9*   MCH 33.3* 33.1* 33.2*   MCHC 32.2* 32.4* 32.0*    121* 121*     Recent Labs     10/08/22  0248 10/09/22  0245 10/10/22  0300   * 142 149*   K 6.4* 4.8 4.5   ANIONGAP 12 9 10    116* 122*   CO2 14* 17* 17*   BUN 63* 60* 46*   CREATININE 2.9* 2.9* 2.6*   GLUCOSE 422* 232* 150*   CALCIUM 8.0* 8.2* 8.3*     Recent Labs     10/08/22  0248 10/08/22  0732 10/09/22  0245 10/10/22  0300   MG 1.6  --  1.7 1.5*   PHOS  -- 3.5 3.0  --      No results for input(s): AST, ALT, ALB, BILITOT, ALKPHOS, ALB in the last 72 hours. No results for input(s): PH, PO2, PCO2, HCO3, BE, O2SAT in the last 72 hours. No results for input(s): TROPONINI in the last 72 hours. No results for input(s): INR in the last 72 hours. No results for input(s): LACTA in the last 72 hours. No intake or output data in the 24 hours ending 10/10/22 1224      US RENAL COMPLETE    Result Date: 10/8/2022  Clinical history: Worsening creatinine Finding: Real-time sonogram study of the right and left kidneys shows normal size and shaped kidneys. The right kidney measures 9.7 x 3.9 x 4.8 cm and the left kidney measures 8.5 x 4.0 x 5.4 cm. The cortical thickness the right kidney is 0.9 cm and left kidney is 0.8 cm. There is no apparent hydronephrosis or renal mass. The retroperitoneal area appears unremarkable. No evidence of aneurysmal dilatation of the aorta is seen. The urinary bladder appears to be normal .There is no mass or debris seen. NORMAL RENAL SONOGRAM.. There is suspicion for possible dilated ureter. I would recommend noncontrast CT of the abdomen and pelvis for further evaluation     Assessment and Plan:   Septic shock  Likely aspiration pneumonia versus CAP  Streptococcus alactolyticus bacteremia sensitivities requested  Enterococcus faecium UTI  Acute hypoxemic respiratory failure significantly improved  YESICA/CKD 4  Elevated D-dimer     Broad-spectrum antibiotics  IVF  Since weaned off norepinephrine gtt  Lactate cleared  No mention of endocarditis per TTE  JULIO CÉSAR only if warranted  Follow repeat blood cultures  ID consulted for outpatient antibiotic regimen  Respiratory status continues to improve with current treatment plan for above issues  V/Q would likely be low yield given abnormal CXR  CT angio contraindicated given YESICA  No evidence of RV strain per TTE  Wells score of 1.5 correlates with low risk for PE  Renal following YESICA  CT ordered to further eval possible dilated ureter on renal ultrasound  Renally dosed Lovenox for DVT prophylaxis  Discussed treatment plan with patient//RN    Giselle Ramos MD   10/10/2022 12:24 PM

## 2022-10-11 LAB
ANION GAP SERPL CALCULATED.3IONS-SCNC: 11 MMOL/L (ref 7–19)
BASOPHILS ABSOLUTE: 0 K/UL (ref 0–0.2)
BASOPHILS RELATIVE PERCENT: 0.3 % (ref 0–1)
BUN BLDV-MCNC: 38 MG/DL (ref 8–23)
CALCIUM SERPL-MCNC: 8.4 MG/DL (ref 8.8–10.2)
CHLORIDE BLD-SCNC: 117 MMOL/L (ref 98–111)
CO2: 18 MMOL/L (ref 22–29)
CREAT SERPL-MCNC: 2.3 MG/DL (ref 0.5–0.9)
EOSINOPHILS ABSOLUTE: 0.4 K/UL (ref 0–0.6)
EOSINOPHILS RELATIVE PERCENT: 3.1 % (ref 0–5)
GFR AFRICAN AMERICAN: 25
GFR NON-AFRICAN AMERICAN: 21
GLUCOSE BLD-MCNC: 117 MG/DL (ref 70–99)
GLUCOSE BLD-MCNC: 142 MG/DL (ref 70–99)
GLUCOSE BLD-MCNC: 227 MG/DL (ref 70–99)
GLUCOSE BLD-MCNC: 232 MG/DL (ref 70–99)
GLUCOSE BLD-MCNC: 326 MG/DL (ref 70–99)
GLUCOSE BLD-MCNC: 59 MG/DL (ref 74–109)
HCT VFR BLD CALC: 28.4 % (ref 37–47)
HEMOGLOBIN: 9 G/DL (ref 12–16)
IMMATURE GRANULOCYTES #: 0.2 K/UL
LYMPHOCYTES ABSOLUTE: 0.9 K/UL (ref 1.1–4.5)
LYMPHOCYTES RELATIVE PERCENT: 6.8 % (ref 20–40)
MAGNESIUM: 1.8 MG/DL (ref 1.6–2.4)
MCH RBC QN AUTO: 32.6 PG (ref 27–31)
MCHC RBC AUTO-ENTMCNC: 31.7 G/DL (ref 33–37)
MCV RBC AUTO: 102.9 FL (ref 81–99)
MONOCYTES ABSOLUTE: 0.8 K/UL (ref 0–0.9)
MONOCYTES RELATIVE PERCENT: 6.7 % (ref 0–10)
NEUTROPHILS ABSOLUTE: 10.1 K/UL (ref 1.5–7.5)
NEUTROPHILS RELATIVE PERCENT: 81.3 % (ref 50–65)
ORGANISM: ABNORMAL
PDW BLD-RTO: 15.9 % (ref 11.5–14.5)
PERFORMED ON: ABNORMAL
PLATELET # BLD: 124 K/UL (ref 130–400)
PMV BLD AUTO: 9.9 FL (ref 9.4–12.3)
POTASSIUM SERPL-SCNC: 4.1 MMOL/L (ref 3.5–5)
RBC # BLD: 2.76 M/UL (ref 4.2–5.4)
SODIUM BLD-SCNC: 146 MMOL/L (ref 136–145)
URINE CULTURE, ROUTINE: ABNORMAL
URINE CULTURE, ROUTINE: ABNORMAL
VANCOMYCIN TROUGH: 20.8 UG/ML (ref 10–20)
WBC # BLD: 12.5 K/UL (ref 4.8–10.8)

## 2022-10-11 PROCEDURE — 6360000002 HC RX W HCPCS: Performed by: HOSPITALIST

## 2022-10-11 PROCEDURE — 80202 ASSAY OF VANCOMYCIN: CPT

## 2022-10-11 PROCEDURE — 83735 ASSAY OF MAGNESIUM: CPT

## 2022-10-11 PROCEDURE — 2580000003 HC RX 258: Performed by: HOSPITALIST

## 2022-10-11 PROCEDURE — 6360000002 HC RX W HCPCS: Performed by: INTERNAL MEDICINE

## 2022-10-11 PROCEDURE — 36592 COLLECT BLOOD FROM PICC: CPT

## 2022-10-11 PROCEDURE — 94640 AIRWAY INHALATION TREATMENT: CPT

## 2022-10-11 PROCEDURE — 6370000000 HC RX 637 (ALT 250 FOR IP): Performed by: HOSPITALIST

## 2022-10-11 PROCEDURE — 80048 BASIC METABOLIC PNL TOTAL CA: CPT

## 2022-10-11 PROCEDURE — 94761 N-INVAS EAR/PLS OXIMETRY MLT: CPT

## 2022-10-11 PROCEDURE — 1210000000 HC MED SURG R&B

## 2022-10-11 PROCEDURE — 85025 COMPLETE CBC W/AUTO DIFF WBC: CPT

## 2022-10-11 PROCEDURE — 82947 ASSAY GLUCOSE BLOOD QUANT: CPT

## 2022-10-11 PROCEDURE — 2700000000 HC OXYGEN THERAPY PER DAY

## 2022-10-11 PROCEDURE — 2580000003 HC RX 258: Performed by: INTERNAL MEDICINE

## 2022-10-11 RX ORDER — CHOLECALCIFEROL (VITAMIN D3) 10 MCG
1 TABLET ORAL DAILY
Status: DISCONTINUED | OUTPATIENT
Start: 2022-10-11 | End: 2022-10-13 | Stop reason: HOSPADM

## 2022-10-11 RX ADMIN — SODIUM BICARBONATE 650 MG: 650 TABLET ORAL at 18:26

## 2022-10-11 RX ADMIN — IPRATROPIUM BROMIDE AND ALBUTEROL SULFATE 1 AMPULE: 2.5; .5 SOLUTION RESPIRATORY (INHALATION) at 10:44

## 2022-10-11 RX ADMIN — IPRATROPIUM BROMIDE AND ALBUTEROL SULFATE 1 AMPULE: 2.5; .5 SOLUTION RESPIRATORY (INHALATION) at 19:35

## 2022-10-11 RX ADMIN — IPRATROPIUM BROMIDE AND ALBUTEROL SULFATE 1 AMPULE: 2.5; .5 SOLUTION RESPIRATORY (INHALATION) at 15:36

## 2022-10-11 RX ADMIN — ACETYLCYSTEINE 600 MG: 200 SOLUTION ORAL; RESPIRATORY (INHALATION) at 19:35

## 2022-10-11 RX ADMIN — IPRATROPIUM BROMIDE AND ALBUTEROL SULFATE 1 AMPULE: 2.5; .5 SOLUTION RESPIRATORY (INHALATION) at 07:05

## 2022-10-11 RX ADMIN — WATER 1000 MG: 1 INJECTION INTRAMUSCULAR; INTRAVENOUS; SUBCUTANEOUS at 16:02

## 2022-10-11 RX ADMIN — SODIUM BICARBONATE 650 MG: 650 TABLET ORAL at 20:52

## 2022-10-11 RX ADMIN — GUAIFENESIN 600 MG: 600 TABLET ORAL at 08:44

## 2022-10-11 RX ADMIN — SODIUM BICARBONATE 650 MG: 650 TABLET ORAL at 08:44

## 2022-10-11 RX ADMIN — ACETYLCYSTEINE 600 MG: 200 SOLUTION ORAL; RESPIRATORY (INHALATION) at 07:05

## 2022-10-11 RX ADMIN — INSULIN GLARGINE 10 UNITS: 100 INJECTION, SOLUTION SUBCUTANEOUS at 08:44

## 2022-10-11 RX ADMIN — INSULIN LISPRO 6 UNITS: 100 INJECTION, SOLUTION INTRAVENOUS; SUBCUTANEOUS at 18:27

## 2022-10-11 RX ADMIN — SODIUM BICARBONATE 650 MG: 650 TABLET ORAL at 14:30

## 2022-10-11 RX ADMIN — VANCOMYCIN HYDROCHLORIDE 750 MG: 750 INJECTION, POWDER, LYOPHILIZED, FOR SOLUTION INTRAVENOUS at 05:57

## 2022-10-11 RX ADMIN — ENOXAPARIN SODIUM 30 MG: 100 INJECTION SUBCUTANEOUS at 08:44

## 2022-10-11 RX ADMIN — SODIUM CHLORIDE, PRESERVATIVE FREE 10 ML: 5 INJECTION INTRAVENOUS at 22:46

## 2022-10-11 RX ADMIN — GUAIFENESIN 600 MG: 600 TABLET ORAL at 20:52

## 2022-10-11 RX ADMIN — EPOETIN ALFA-EPBX 10000 UNITS: 10000 INJECTION, SOLUTION INTRAVENOUS; SUBCUTANEOUS at 16:01

## 2022-10-11 ASSESSMENT — PAIN SCALES - GENERAL: PAINLEVEL_OUTOF10: 0

## 2022-10-11 NOTE — PROGRESS NOTES
Hospitalist Progress Note  [facility]     Patient: Tera Allen  : 1950  MRN: [id number]  Code Status: Full Code    Hospital Day: 5   Date of Service: 10/11/2022    Subjective:   Patient seen and examined. No current complaints. Past Medical History:   Diagnosis Date    Chronic kidney disease     stage 4 not receiving dialysis    Diabetes mellitus ([address]     type 1 dx     Gallstones     Hypertension     Lactose intolerance     Osteopenia     UTI (urinary tract infection)     in ICU in April with UTI       Past Surgical History:   Procedure Laterality Date    CARPAL TUNNEL RELEASE Bilateral     CATARACT REMOVAL      FRACTURE SURGERY         History reviewed. No pertinent family history.     Social History     Socioeconomic History    Marital status:      Spouse name: Not on file    Number of children: Not on file    Years of education: Not on file    Highest education level: Not on file   Occupational History    Not on file   Tobacco Use    Smoking status: Never    Smokeless tobacco: Never   Substance and Sexual Activity    Alcohol use: Never    Drug use: Never    Sexual activity: Not on file   Other Topics Concern    Not on file   Social History Narrative    Not on file     Social Determinants of Health     Financial Resource Strain: Not on file   Food Insecurity: Not on file   Transportation Needs: Not on file   Physical Activity: Not on file   Stress: Not on file   Social Connections: Not on file   Intimate Partner Violence: Not on file   Housing Stability: Not on file       Current Facility-Administered Medications   Medication Dose Route Frequency Provider Last Rate Last Admin    b complex-C-folic acid (NEPHROCAPS) capsule 1 mg  1 capsule Oral Daily Марина Sanchez MD        epoetin ronaldo-epbx (RETACRIT) injection 10,000 Units  10,000 Units SubCUTAneous Weekly Марина Sanchez MD        sodium bicarbonate tablet 650 mg  650 mg Oral 4x Daily Gayle Holder MD 650 mg at 10/10/22 2009    insulin lispro (HUMALOG) injection vial 0-8 Units  0-8 Units SubCUTAneous TID WC Ata Farris MD   6 Units at 10/10/22 1742    insulin lispro (HUMALOG) injection vial 0-4 Units  0-4 Units SubCUTAneous Nightly Ata Farris MD        cefTRIAXone (ROCEPHIN) 1,000 mg in sterile water 10 mL IV syringe  1,000 mg IntraVENous Q24H Noemi Guerrero MD   1,000 mg at 10/10/22 1325    vancomycin (VANCOCIN) intermittent dosing (placeholder)   Other RX Placeholder Noemi Guerrero MD        insulin glargine (LANTUS) injection vial 10 Units  10 Units SubCUTAneous BID Ata Farris MD   10 Units at 10/10/22 2013    polyethylene glycol (GLYCOLAX) packet 17 g  17 g Oral Daily Ata Farris MD        sennosides-docusate sodium (SENOKOT-S) 8.6-50 MG tablet 2 tablet  2 tablet Oral Daily Ata Farris MD        glucose chewable tablet 16 g  4 tablet Oral PRN Tomas Malone MD        dextrose bolus 10% 125 mL  125 mL IntraVENous PRN Tomas Malone MD        Or    dextrose bolus 10% 250 mL  250 mL IntraVENous PRN Tomas Malone MD        glucagon (rDNA) injection 1 mg  1 mg SubCUTAneous PRN Tomas Malone MD        dextrose 10 % infusion   IntraVENous Continuous PRN Tomas Malone MD        sodium chloride flush 0.9 % injection 5-40 mL  5-40 mL IntraVENous 2 times per day Ata Farris MD   10 mL at 10/10/22 2010    sodium chloride flush 0.9 % injection 10 mL  10 mL IntraVENous PRN Ata Farris MD        0.9 % sodium chloride infusion   IntraVENous PRN Ata Farris MD        enoxaparin Sodium (LOVENOX) injection 30 mg  30 mg SubCUTAneous Daily Ata Farris MD   30 mg at 10/10/22 1018    ondansetron (ZOFRAN-ODT) disintegrating tablet 4 mg  4 mg Oral Q8H PRN Ata Farris MD        Or    ondansetron [facility]) injection 4 mg  4 mg IntraVENous Q6H PRN Ata Farris MD        polyethylene glycol (GLYCOLAX) packet 17 g  17 g Oral Daily PRN Gayle Holder MD        acetaminophen (TYLENOL) tablet 650 mg  650 mg Oral Q6H PRN Gayle Holder MD        Or    acetaminophen (TYLENOL) suppository 650 mg  650 mg Rectal Q6H PRN Gayle Holder MD        ipratropium-albuterol (DUONEB) nebulizer solution 1 ampule  1 ampule Inhalation Q4H WA Gayle Holder MD   1 ampule at 10/11/22 1044    acetylcysteine (MUCOMYST) 20 % solution 600 mg  600 mg Inhalation BID Gayle Holder MD   600 mg at 10/11/22 0705    guaiFENesin [facility]) extended release tablet 600 mg  600 mg Oral BID Gayle Holder MD   600 mg at 10/10/22 2009         dextrose      sodium chloride          Objective:   /61   Pulse 96   Temp 98.3 °F (36.8 °C)   Resp 18   Ht 5' 4\" (1.626 m)   Wt 177 lb 3.2 oz (80.4 kg)   SpO2 98%   BMI 30.42 kg/m²     General: no acute distress  HEENT: normocephalic  Neck: supple, symmetrical, trachea midline   Lungs: improving scattered rhonchi  Cardiovascular: s1 and s2 normal  Abdomen: soft, positive bowel sounds, nondistended, nontender  Extremities: no edema or cyanosis   Neuro: aaox3, no focal deficits    Recent Labs     10/09/22  0245 10/10/22  0300 10/11/22  0300   WBC 22.1* 17.3* 12.5*   RBC 2.72* 2.80* 2.76*   HGB 9.0* 9.3* 9.0*   HCT 27.8* 29.1* 28.4*   .2* 103.9* 102.9*   MCH 33.1* 33.2* 32.6*   MCHC 32.4* 32.0* 31.7*   * 121* 124*     Recent Labs     10/09/22  0245 10/10/22  0300 10/11/22  0300    149* 146*   K 4.8 4.5 4.1   ANIONGAP 9 10 11   * 122* 117*   CO2 17* 17* 18*   BUN 60* 46* 38*   CREATININE 2.9* 2.6* 2.3*   GLUCOSE 232* 150* 59*   CALCIUM 8.2* 8.3* 8.4*     Recent Labs     10/09/22  0245 10/10/22  0300 10/11/22  0300   MG 1.7 1.5* 1.8   PHOS 3.0  --   --      No results for input(s): AST, ALT, ALB, BILITOT, ALKPHOS, ALB in the last 72 hours.   No results for input(s): PH, PO2, PCO2, HCO3, BE, O2SAT in the last 72 hours. No results for input(s): TROPONINI in the last 72 hours. No results for input(s): INR in the last 72 hours. No results for input(s): LACTA in the last 72 hours. Intake/Output Summary (Last 24 hours) at 10/11/2022 1403  Last data filed at 10/11/2022 0955  Gross per 24 hour   Intake 0 ml   Output --   Net 0 ml       No results found.      Assessment and Plan:   Septic shock  Likely aspiration pneumonia versus CAP  Streptococcus alactolyticus bacteremia sensitivities requested  Enterococcus faecium UTI  Acute hypoxemic respiratory failure significantly improved  YESICA/CKD 4  Elevated D-dimer     Agents per ID  Since weaned off norepinephrine gtt  Fluids per renal  Lactate cleared  No mention of endocarditis per TTE  JULIO CÉSAR only if warranted  Follow repeat blood cultures  Respiratory status continues to improve with current treatment plan for above issues  V/Q would likely be low yield given abnormal CXR  CT angio contraindicated given YESICA  No evidence of RV strain per TTE  Wells score of 1.5 correlates with low risk for PE  Renal following YESICA  CT ordered to further eval possible dilated ureter on renal ultrasound  Renally dosed Lovenox for DVT prophylaxis  Discussed treatment plan with patient//RN    Jose Eduardo Kearney MD   10/11/2022 2:03 PM

## 2022-10-11 NOTE — CONSULTS
INFECTIOUS DISEASES CONSULT NOTE    Patient:  Rand Flores 70 y.o. female  ROOM # [unfilled]  YOB: 1950  MRN: [id number]  CSN:  [id number]  Admit date: 10/6/2022   Admitting Physician: Thien Arana MD  Primary Care Physician: No primary care provider on file. REFERRING PROVIDER: No ref. provider found    Reason for Consultation: Streptococcus alactolyticus bacteremia/Enterococcus faecium UTI    History of Present Illness/Chief Complaint: Pleasant 27-year-old woman. She has a history of diabetes mellitus. She has a history of chronic kidney disease. She [place] with her . She is from the [facility]. There are recently Dr. Clyde Sanon which is in this area. She developed low blood sugar per her report along with nausea, vomiting, and fatigue. She presented to the hospital.  She had significant pyuria. She has had leukocytosis. She has been receiving antibiotic treatment. She had had a positive blood culture. Overall she is feeling some better. Infectious disease asked to evaluate and offer antimicrobial recommendations.     Current Scheduled Medications:    sodium bicarbonate  650 mg Oral 4x Daily    insulin lispro  0-8 Units SubCUTAneous TID WC    insulin lispro  0-4 Units SubCUTAneous Nightly    cefTRIAXone (ROCEPHIN) IV  1,000 mg IntraVENous Q24H    azithromycin  500 mg IntraVENous Q24H    vancomycin (VANCOCIN) intermittent dosing (placeholder)   Other RX Placeholder    insulin glargine  10 Units SubCUTAneous BID    polyethylene glycol  17 g Oral Daily    sennosides-docusate sodium  2 tablet Oral Daily    sodium chloride flush  5-40 mL IntraVENous 2 times per day    enoxaparin  30 mg SubCUTAneous Daily    ipratropium-albuterol  1 ampule Inhalation Q4H WA    acetylcysteine  600 mg Inhalation BID    guaiFENesin  600 mg Oral BID     Current PRN Medications:  glucose, dextrose bolus **OR** dextrose bolus, glucagon (rDNA), dextrose, sodium chloride flush, sodium chloride, ondansetron **OR** ondansetron, polyethylene glycol, acetaminophen **OR** acetaminophen    Allergies: Allergies   Allergen Reactions    Pcn [Penicillins]        Past Medical History: Chronic kidney disease. Diabetes mellitus. Hypertension. Osteopenia. Urinary tract infection. Past Surgical History: Fracture surgery. Cataract removal.  Carpal tunnel release. Social History: . No tobacco use. No alcohol use. No illicit drug use. Family History: Noncontributory    Exposure History: No close contacts of been ill    Review of Systems: No headache or other neurological complaints. No gastrointestinal symptoms. No cough or shortness of breath. Vital Signs:  /61   Pulse 96   Temp 98.3 °F (36.8 °C)   Resp 18   Ht 5' 4\" (1.626 m)   Wt 177 lb 3.2 oz (80.4 kg)   SpO2 98%   BMI 30.42 kg/m²  Temp (24hrs), Av.3 °F (36.8 °C), Min:98.1 °F (36.7 °C), Max:98.6 °F (37 °C)    Physical Exam:   Vital signs reviewed.   Lungs without crackles or wheezes  Heart distant heart sounds but no apparent murmur  Abdomen soft and nontender  No suprapubic or flank tenderness    Lab Results:  CBC:   Recent Labs     10/09/22  0245 10/10/22  0300 10/11/22  0300   WBC 22.1* 17.3* 12.5*   HGB 9.0* 9.3* 9.0*   HCT 27.8* 29.1* 28.4*   * 121* 124*   LYMPHOPCT 5.3* 5.7* 6.8*   MONOPCT 4.0 4.6 6.7     CMP:   Recent Labs     10/09/22  0245 10/10/22  0300 10/11/22  030    149* 146*   K 4.8 4.5 4.1   * 122* 117*   CO2 17* 17* 18*   BUN 60* 46* 38*   CREATININE 2.9* 2.6* 2.3*   CALCIUM 8.2* 8.3* 8.4*   GLUCOSE 232* 150* 59*     Urinalysis on admission:    Component Ref Range & Units 10/7/22 0018    Bacteria, UA None Seen /HPF 3+ Abnormal     Crystals, UA None Seen /HPF NEG Abnormal     Hyaline Casts, UA 0 - 8 /HPF 6    WBC, UA 0 - 5 /HPF >900 High     RBC, UA 0 - 4 /HPF 11 High     Epithelial Cells, UA 0 - 5 /HPF 2        Culture:   Urine culture 10/7/2022 - Enterococcus faecium  Blood culture 10/7/2022 - no growth  Blood culture 10/7/2022 - Streptococcus alactolyticus  (Susceptibility pending)  Blood cultures October 9, 2022-no growth  Blood cultures October 9, 2022-no growth    Radiology:   Renal ultrasound October 8, 2022:    Impression   NORMAL RENAL SONOGRAM.. There is suspicion for possible dilated ureter. I would recommend noncontrast CT   of the abdomen and pelvis for further evaluation     CT scan of the abdomen and pelvis done October 9, 2022-Per epic study appears to have been completed. No report yet available. Chest x-ray done October 6, 2022: Additional Studies Reviewed:     2D echocardiogram on 10/7/2022:  Conclusions  Summary  Normal left ventricular size with normal LV function and an estimated ejection fraction of approximately 70-75%. No regional wall  motion abnormalities. Normal left ventricular wall thickness. Grade II diastolic dysfunction with evidence for increased LVEDP. Normal right ventricular size with preserved RV function (TAPSE 28 mm). Normal bi-atrial size. Mild mitral valve stenosis (mean gradient 5 mmHg at  bpm). Mild tricuspid regurgitation with estimated RVSP of 43 mm Hg, suggestive for mild pulmonary hypertension. Aortic root and ascending aorta are within normal limits. No evidence of significant pericardial effusion is noted. The rhythm is sinus. Hemodynamic parameters: Normal SVI 40 ml/m2 and CI 3.9 L/min/m2 (average HR 98 bpm). No previous studies. Signature  Electronically signed by Huy Barger(Interpreting physician) on 10/07/2022 09:00 AM    Impression:   1. Transient bacteremia with Streptococcus-suspect urinary source  2. Diabetes mellitus  3. Nausea/vomiting-improved  4.   Acute on chronic renal insufficiency-showing some improvement    Recommendations:    Simplify antibiotic treatment ceftriaxone  Await susceptibility on Streptococcus species  Await CT abdomen and pelvis  She seems to be improving  Anticipate transitioning to oral antibiotic treatment in the next few days  Continue to follow    Addendum October 11, 2022 at 10:30 AM:  She does not seem to have significant cough or productive sputum. She remains on oxygen at 2 L with saturations in the mid 90s. There was some infiltrate on initial chest x-ray. Feel ceftriaxone adequate coverage. Continue to follow.     Shelby Mota MD  10/11/22  8:25 AM

## 2022-10-11 NOTE — PROGRESS NOTES
Received call from lab stating that patient's blood sugar was 59. Patient given orange juice with 2 sugar packets and melvi crackers. Will continue to monitor blood sugar.     04:45 Blood sugar 117

## 2022-10-11 NOTE — ACP (ADVANCE CARE PLANNING)
Advance Care Planning   Advance Care Planning (ACP)     Attempted to discuss ACP with Ms. Nany Shankar today, she refuses to participate in ACP due to Baptism reason. Earlier, she said Sri Jayson Muslim. \"    Electronically signed by Cristel Ferrari  [employer] on 10/11/2022 at 2:53 PM

## 2022-10-11 NOTE — CARE COORDINATION
CM attempted to initiate conversation with patient regarding comments shared with CNA. Per CNA note patient was reluctant to have information shared with spouse regarding the patient's incontinence. CM went to room to start conversation with patient. Patient was reluctant to respond, she did say \"spouse would be coming\" CM asked if he would be here all day. It appeared patient was concerned spouse would be arriving to room at any time. Pt wanted to know what CM wanted to talk about. CM stated, routine to talk with all patient. Now CM wanted to know if patient would be safe returning to home and discuss comments made to  staff. Patient seemed irritated and stated she \"would be safe to return home\" CM advised patient their are other places patient can return to rather that home. Pt appeared irritated about this discussion. CM advised that is patient wanted to see a local Provider regarding issues. Pt indicated, they are from [place], and go home once a month to see \"doctors\" CM advised patent if she was to talk CM office right across the [place]bert from her.    Electronically signed by Ken Price RN on 10/11/2022 at 11:18 AM

## 2022-10-11 NOTE — PROGRESS NOTES
Nephrology ([facility]) progress note      Patient:  Dayan Breen  YOB: 1950  Date of Service: 10/11/2022  MRN: [id number]   Acct: [de-identified]   Primary Care Physician: No primary care provider on file. Advance Directive: Full Code  Admit Date: 10/6/2022       Hospital Day: 5  Referring Provider: Giselle Ramos MD    Patient independently seen and examined, Chart, Consults, Notes, Operative notes, Labs, Cardiology, and Radiology studies reviewed as available. Subjective:  Dayan Breen is a 70 y.o. female for whom we were consulted for evaluation and treatment of acute on chronic kidney disease. Patient recalls following with nephrologist in [facility] for chronic kidney disease. She is unable to quantify the GFR or creatinine. She was admitted for nausea, vomiting, cystitis. She denied hematuria, hemoptysis, rash, chest pain but has shortness of air at rest. She tolerated Lokelma without issue with subsequent improvement in potassium. Up in chair and feeling better. She was anxious for discharge. She remains on O2 and is using some spirometry. Today, no overnight events.       Allergies:  Pcn [penicillins]    Medicines:  Current Facility-Administered Medications   Medication Dose Route Frequency Provider Last Rate Last Admin    sodium bicarbonate tablet 650 mg  650 mg Oral 4x Daily Dulce Chahal MD   650 mg at 10/10/22 2009    insulin lispro (HUMALOG) injection vial 0-8 Units  0-8 Units SubCUTAneous TID WC Dulce Chahal MD   6 Units at 10/10/22 1742    insulin lispro (HUMALOG) injection vial 0-4 Units  0-4 Units SubCUTAneous Nightly Dulce Chahal MD        cefTRIAXone (ROCEPHIN) 1,000 mg in sterile water 10 mL IV syringe  1,000 mg IntraVENous Q24H Giselle Ramos MD   1,000 mg at 10/10/22 1325    vancomycin (VANCOCIN) intermittent dosing (placeholder)   Other Danny Herrera MD        insulin glargine (LANTUS) injection vial 10 Units  10 Units SubCUTAneous BID Wiliam Lovell MD   10 Units at 10/10/22 2013    polyethylene glycol (GLYCOLAX) packet 17 g  17 g Oral Daily Wiliam Lovell MD        sennosides-docusate sodium (SENOKOT-S) 8.6-50 MG tablet 2 tablet  2 tablet Oral Daily Wiliam Lovell MD        glucose chewable tablet 16 g  4 tablet Oral PRN Tor Marcial MD        dextrose bolus 10% 125 mL  125 mL IntraVENous PRN Tor Marcial MD        Or    dextrose bolus 10% 250 mL  250 mL IntraVENous PRN Tor Marcial MD        glucagon (rDNA) injection 1 mg  1 mg SubCUTAneous PRN Tor Marcial MD        dextrose 10 % infusion   IntraVENous Continuous PRN Tor Marcial MD        sodium chloride flush 0.9 % injection 5-40 mL  5-40 mL IntraVENous 2 times per day Wiliam Lovell MD   10 mL at 10/10/22 2010    sodium chloride flush 0.9 % injection 10 mL  10 mL IntraVENous PRN Wiliam Lovell MD        0.9 % sodium chloride infusion   IntraVENous PRN Wiliam Lovell MD        enoxaparin Sodium (LOVENOX) injection 30 mg  30 mg SubCUTAneous Daily Wiliam Lovell MD   30 mg at 10/10/22 1018    ondansetron (ZOFRAN-ODT) disintegrating tablet 4 mg  4 mg Oral Q8H PRN Wiliam Lovell MD        Or    [facility]ans[facility]ron [facility] injection 4 mg  4 mg IntraVENous Q6H PRN Wiliam Lovell MD        polyethylene glycol (GLYCOLAX) packet 17 g  17 g Oral Daily PRN Wiliam Lovell MD        acetaminophen (TYLENOL) tablet 650 mg  650 mg Oral Q6H PRN Wiliam Lovell MD        Or    acetaminophen (TYLENOL) suppository 650 mg  650 mg Rectal Q6H PRN Wiliam Lovell MD        ipratropium-albuterol (DUONEB) nebulizer solution 1 ampule  1 ampule Inhalation Q4H WA Wiliam Lovell MD   1 ampule at 10/11/22 1044    acetylcysteine (MUCOMYST) 20 % solution 600 mg  600 mg Inhalation BID Wiliam Lovell MD   600 mg at 10/11/22 0705    guaiFENesin [facility]) extended release tablet 600 mg  600 mg Oral BID Dulce Chahal MD   600 mg at 10/10/22 2009       Past Medical History:  Past Medical History:   Diagnosis Date    Chronic kidney disease     stage 4 not receiving dialysis    Diabetes mellitus ([address]     type 1 dx 1963    Gallstones     Hypertension     Lactose intolerance     Osteopenia     UTI (urinary tract infection)     in ICU in April with UTI       Past Surgical History:  Past Surgical History:   Procedure Laterality Date    CARPAL TUNNEL RELEASE Bilateral     CATARACT REMOVAL      FRACTURE SURGERY         Family History  History reviewed. No pertinent family history.     Social History  Social History     Socioeconomic History    Marital status:      Spouse name: Not on file    Number of children: Not on file    Years of education: Not on file    Highest education level: Not on file   Occupational History    Not on file   Tobacco Use    Smoking status: Never    Smokeless tobacco: Never   Substance and Sexual Activity    Alcohol use: Never    Drug use: Never    Sexual activity: Not on file   Other Topics Concern    Not on file   Social History Narrative    Not on file     Social Determinants of Health     Financial Resource Strain: Not on file   Food Insecurity: Not on file   Transportation Needs: Not on file   Physical Activity: Not on file   Stress: Not on file   Social Connections: Not on file   Intimate Partner Violence: Not on file   Housing Stability: Not on file         Review of Systems:  History obtained from chart review and the patient  General ROS: No fever or chills  Respiratory ROS: No cough, +shortness of breath, +wheezing  Cardiovascular ROS: No chest pain or palpitations  Gastrointestinal ROS: No abdominal pain or melena  Genito-Urinary ROS: + dysuria, no hematuria  Musculoskeletal ROS: No joint pain or swelling   14 point ROS reviewed with the patient and negative except as noted above and in the HPI unless unable to obtain. Objective:  Patient Vitals for the past 24 hrs:   BP Temp Temp src Pulse Resp SpO2 Weight   10/11/22 0705 -- -- -- -- -- 98 % --   10/11/22 0639 130/61 98.3 °F (36.8 °C) -- 96 18 92 % --   10/11/22 0445 -- -- -- -- -- -- 177 lb 3.2 oz (80.4 kg)   10/10/22 1952 (!) 145/70 98.1 °F (36.7 °C) Oral (!) 109 20 93 % --   10/10/22 1856 -- -- -- -- -- 93 % --   10/10/22 1642 (!) 137/55 98.6 °F (37 °C) Temporal (!) 112 17 92 % --   10/10/22 1435 -- -- -- -- -- 94 % --         Intake/Output Summary (Last 24 hours) at 10/11/2022 1144  Last data filed at 10/11/2022 0955  Gross per 24 hour   Intake 240 ml   Output --   Net 240 ml       General: awake/alert   Chest:  Scattered fin erales  CVS: regular rate and rhythm  Abdominal: soft, nontender, normal bowel sounds  Extremities: no cyanosis or edema  Skin: warm and dry without rash      Labs:  BMP:   Recent Labs     10/09/22  0245 10/10/22  0300 10/11/22  0300    149* 146*   K 4.8 4.5 4.1   * 122* 117*   CO2 17* 17* 18*   PHOS 3.0  --   --    BUN 60* 46* 38*   CREATININE 2.9* 2.6* 2.3*   CALCIUM 8.2* 8.3* 8.4*       CBC:   Recent Labs     10/09/22  0245 10/10/22  0300 10/11/22  0300   WBC 22.1* 17.3* 12.5*   HGB 9.0* 9.3* 9.0*   HCT 27.8* 29.1* 28.4*   .2* 103.9* 102.9*   * 121* 124*       LIVER PROFILE:   No results for input(s): AST, ALT, LIPASE, BILIDIR, BILITOT, ALKPHOS in the last 72 hours. Invalid input(s): AMYLASE,  ALB    PT/INR: No results for input(s): PROTIME, INR in the last 72 hours. APTT: No results for input(s): APTT in the last 72 hours. BNP:  No results for input(s): BNP in the last 72 hours. Ionized Calcium:No results for input(s): IONCA in the last 72 hours. Magnesium:  Recent Labs     10/09/22  0245 10/10/22  0300 10/11/22  0300   MG 1.7 1.5* 1.8       Phosphorus:  Recent Labs     10/09/22  0245   PHOS 3.0       HgbA1C: No results for input(s): LABA1C in the last 72 hours.   Hepatic:   No results for input(s): ALKPHOS, ALT, AST, PROT, BILITOT, BILIDIR, LABALBU in the last 72 hours. Lactic Acid:   No results for input(s): LACTA in the last 72 hours. Troponin: No results for input(s): CKTOTAL, CKMB, TROPONINT in the last 72 hours. ABGs: No results for input(s): PH, PCO2, PO2, HCO3, O2SAT in the last 72 hours. CRP:  No results for input(s): CRP in the last 72 hours. Sed Rate:  No results for input(s): SEDRATE in the last 72 hours. Cultures:   No results for input(s): CULTURE in the last 72 hours. Recent Labs     10/09/22  1220 10/09/22  1312   BC No Growth to date. Any change in status will be called. --    BLOODCULT2  --  No Growth to date. Any change in status will be called. No results for input(s): CXSURG in the last 72 hours. Radiology reports as per the Radiologist  Radiology: XR ABDOMEN (KUB) (SINGLE AP VIEW)    Result Date: 10/7/2022  Clinical history: Abdominal pain Finding: The bowel gas pattern is normal.There is fecal stasis suggesting of constipation. No evidence of organomegaly or abnormal calcifications is seen. The osseous structures of the abdomen and pelvis appear to be normal.There is mild atherosclerotic change of both iliac arteries with calcified plaque. There is a catheter seen in the pelvic region. .    Non-specific gas pattern. Fecal stasis suggesting of constipation    XR CHEST PORTABLE    Result Date: 10/7/2022  Patient: Leo Cartwright  Time Out: 02:24Exam(s): FILM CXR 1 VIEW EXAM:  XR Chest, 1 ViewCLINICAL HISTORY:   Reason for exam: central line placement. TECHNIQUE:  Frontal view of the chest.COMPARISON:  10/6/2022    Right central line in the upper SVCElectronically signed by Sharon Dsouza MD on 10-07-22 at 0224    XR CHEST PORTABLE    Result Date: 10/6/2022  Patient: Leo Cartwright  Time Out: 23:43Exam(s): FILM CXR 1 VIEW EXAM:  XR Chest, 1 ViewCLINICAL HISTORY:   Reason for exam: shortness of breath. TECHNIQUE:  Frontal view of the chest.COMPARISON:  No relevant prior studies available. FINDINGS:  Lungs:  Airspace opacities within the mid to lower lungs likely infectious. Pleural space:  Unremarkable. Heart:  Unremarkable. Mediastinum:  Unremarkable. Bones/joints:  Unremarkable. Airspace opacities within the mid to lower lungs likely infectious. Electronically signed by Dorothea Soto MD on 10-06-22 at 2343    ECHO 2D [address]    Result Date: 10/7/2022  Transthoracic Echocardiography Report (TTE)  Demographics   Patient Name   Dennis Hood  Date of Study           10/07/2022   MRN            [id number]        Gender                  Female   Date of Birth  1950    Room Number             [id number]   Age            70 year(s)   Height:        64 inches     Referring Physician     Дмитрий Barrera   Weight:        155 pounds    Sonographer             Yesenia Milan   BSA:           1.76 m^2      Interpreting Physician  Gali Sher   BMI:           26.61 kg/m^2  Procedure Type of Study   TTE procedure:ECHO 2D W/DOPPLER/COLOR/CONTRAST. Study Location: Portable Technical Quality: Limited visualization due to poor acoustical window. Patient Status: Inpatient Contrast Medium: Definity. HR: 98 bpm BP: 74/52 mmHg Indications:Dyspnea/SOB. Conclusions   Summary  Normal left ventricular size with normal LV function and an estimated  ejection fraction of approximately 70-75%. No regional wall motion  abnormalities. Normal left ventricular wall thickness. Grade II diastolic  dysfunction with evidence for increased LVEDP. Normal right ventricular size with preserved RV function (TAPSE 28 mm). Normal bi-atrial size. Mild mitral valve stenosis (mean gradient 5 mmHg at  bpm). Mild tricuspid regurgitation with estimated RVSP of 43 mm Hg, suggestive  for mild pulmonary hypertension. Aortic root and ascending aorta are within normal limits. No evidence of significant pericardial effusion is noted. The rhythm is sinus.   Hemodynamic parameters: Normal SVI 40 ml/m2 and CI 3.9 L/min/m2 (average  HR 98 bpm). No previous studies. Signature   ----------------------------------------------------------------  Electronically signed by Beatriz Barger(Interpreting physician)  on 10/07/2022 09:00 AM  ----------------------------------------------------------------   Findings   Mitral Valve  Mildly sclerosed posterior annulus and leaflet of the mitral valve with  mildly reduced leaflet mobility. Mild mitral valve stenosis (mean gradient  5 mmHg at  bpm). Trivial mitral regurgitation. Aortic Valve  Aortic valve appears to be tricuspid. Structurally normal aortic valve. No significant aortic regurgitation or stenosis is noted. Tricuspid Valve  Tricuspid valve is structurally normal.  Mild tricuspid regurgitation with estimated RVSP of 43 mm Hg, suggestive  for mild pulmonary hypertension. Pulmonic Valve  The pulmonic valve was not well visualized. No evidence of pulmonic stenosis. No evidence of pulmonic regurgitation. Left Atrium  Normal size left atrium. Left Ventricle  Normal left ventricular size with normal LV function and an estimated  ejection fraction of approximately 70-75%. No regional wall motion abnormalities. Normal left ventricular wall thickness. No evidence of left ventricular mass or thrombus noted. Grade II diastolic dysfunction with evidence for increased LVEDP. Right Atrium  Normal right atrial dimension with no evidence of thrombus or mass noted. Right Ventricle  Normal right ventricular size with preserved RV function (TAPSE 28 mm). Pericardial Effusion  No evidence of significant pericardial effusion is noted. Pleural Effusion  No evidence of pleural effusion. Miscellaneous  Aortic root and ascending aorta are within normal limits. The IVC is normal.  Doppler of the pulmonary veins shows normal flow. Definity contrast was utilized to enhance the endocardial borders. The rhythm is sinus.   Hemodynamic parameters: SVI 40 ml/m2 and CI 3.9 L/min/m2 (average HR 98  bpm). 2D Measurements and Calculations(cm)   LVIDd: 4.55 cm                      LVIDs: 3.28 cm  IVSd: 0.98 cm  LVPWd: 0.97 cm                      AO Root Dimension: 2.2 cm  Rt. Vent.  Dimension: 2.44 cm        LA Dimension: 3.1 cm  % Ejection Fraction: 55 %           LA Area: 12.1 cm^2  LA Volume: 22.5 ml                  LV Systolic dimension: 2.37QE  LA Volume Index: 13 ml/m^2          LV PW diastolic: 5.82VL  LV dimension: 4.55 cm               LVOT diameter: 2 cm                                      RA Systolic pressure: 3mmHg  LVEDV: 85.1 ml                      RV Diastolic dimension: 8.87EM  LVESV:25.8 ml                       LVEDVI: 48 ml/m^2  Cardic Output (CO): 6.83l/min       LVESVI: 15 ml/m^2  Ascending Aorta: 2.5 cm  Doppler Measurements and Calculations   AV Peak Velocity:196 cm/s              MV Peak E-Wave: 104 cm/s  AV Mean Velocity:130 cm/s              MV Peak A-Wave: 115 cm/s  AV Peak Gradient: 15.37 mmHg           MV E/A Ratio: 0.9 %  AV Mean Gradient: 8 mmHg               MV Mean velocity: 105cm/s  AV Area (Continuity):1.83 cm^2         MV Peak Gradient: 4.33 mmHg  AV VTI:38 cm/s                         MV Mean gradient: 5 mmHg  TR Velocity:297 cm/s                   MV P1/2t: 74 msec  TR Gradient:35.28 mmHg                 MVA by PHT2.97 cm^2  Estimated RAP:8 mmHg  RVSP:43 mmHg   MV E' septal velocity: 5.77cm/s  MV E' lateral velocity:8.38 cm/s         Assessment   -Acute kidney injury-ATN  -Chronic kidney disease stage IV   -Hyperkalemia  -Acute cystitis  -Sepsis  -Metabolic acidosis  -Diabetes type 1 with renal disease  -Hypernatremia  -Acute hypoxemic resp failure  -Anemia of CKD  -Elevated vanco trough  -Pneumonia      Plan:  Discussed with patient  Work-up reviewed todate  Monitor labs  Potassium better   Continue oral alkali   Antibiotics per primary service  Encouraged oral water intake  Will add MVI and Retacrit    Janina Hui MD  10/11/22  11:44 AM

## 2022-10-11 NOTE — PLAN OF CARE
Problem: Discharge Planning  Goal: Discharge to home or other facility with appropriate resources  Outcome: Progressing     Problem: Pain  Goal: Verbalizes/displays adequate comfort level or baseline comfort level  Outcome: Progressing     Problem: Safety - Adult  Goal: Free from fall injury  Outcome: Progressing     Problem: Chronic Conditions and Co-morbidities  Goal: Patient's chronic conditions and co-morbidity symptoms are monitored and maintained or improved  Outcome: Progressing  Flowsheets (Taken 10/10/2022 2015)  Care Plan - Patient's Chronic Conditions and Co-Morbidity Symptoms are Monitored and Maintained or Improved: Monitor and assess patient's chronic conditions and comorbid symptoms for stability, deterioration, or improvement
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
## 2022-10-12 LAB
ANION GAP SERPL CALCULATED.3IONS-SCNC: 12 MMOL/L (ref 7–19)
BASOPHILS ABSOLUTE: 0.1 K/UL (ref 0–0.2)
BASOPHILS RELATIVE PERCENT: 0.4 % (ref 0–1)
BLOOD CULTURE, ROUTINE: NORMAL
BUN BLDV-MCNC: 35 MG/DL (ref 8–23)
CALCIUM SERPL-MCNC: 8.7 MG/DL (ref 8.8–10.2)
CHLORIDE BLD-SCNC: 113 MMOL/L (ref 98–111)
CO2: 19 MMOL/L (ref 22–29)
CREAT SERPL-MCNC: 2 MG/DL (ref 0.5–0.9)
CREATININE URINE: 51 MG/DL (ref 4.2–622)
EOSINOPHILS ABSOLUTE: 0.5 K/UL (ref 0–0.6)
EOSINOPHILS RELATIVE PERCENT: 3.9 % (ref 0–5)
GFR AFRICAN AMERICAN: 30
GFR NON-AFRICAN AMERICAN: 25
GLUCOSE BLD-MCNC: 146 MG/DL (ref 74–109)
GLUCOSE BLD-MCNC: 296 MG/DL (ref 70–99)
GLUCOSE BLD-MCNC: 339 MG/DL (ref 70–99)
GLUCOSE BLD-MCNC: 368 MG/DL (ref 70–99)
HCT VFR BLD CALC: 29.3 % (ref 37–47)
HEMOGLOBIN: 9.2 G/DL (ref 12–16)
IMMATURE GRANULOCYTES #: 0.4 K/UL
LYMPHOCYTES ABSOLUTE: 0.9 K/UL (ref 1.1–4.5)
LYMPHOCYTES RELATIVE PERCENT: 8.1 % (ref 20–40)
MAGNESIUM: 1.7 MG/DL (ref 1.6–2.4)
MCH RBC QN AUTO: 32.3 PG (ref 27–31)
MCHC RBC AUTO-ENTMCNC: 31.4 G/DL (ref 33–37)
MCV RBC AUTO: 102.8 FL (ref 81–99)
MONOCYTES ABSOLUTE: 0.9 K/UL (ref 0–0.9)
MONOCYTES RELATIVE PERCENT: 8.2 % (ref 0–10)
NEUTROPHILS ABSOLUTE: 8.6 K/UL (ref 1.5–7.5)
NEUTROPHILS RELATIVE PERCENT: 75.7 % (ref 50–65)
PDW BLD-RTO: 15.7 % (ref 11.5–14.5)
PERFORMED ON: ABNORMAL
PLATELET # BLD: 115 K/UL (ref 130–400)
PMV BLD AUTO: 10 FL (ref 9.4–12.3)
POTASSIUM REFLEX MAGNESIUM: 4 MMOL/L (ref 3.5–5)
POTASSIUM SERPL-SCNC: 4 MMOL/L (ref 3.5–5)
PROTEIN PROTEIN: 44 MG/DL (ref 15–45)
RBC # BLD: 2.85 M/UL (ref 4.2–5.4)
SODIUM BLD-SCNC: 144 MMOL/L (ref 136–145)
SODIUM URINE: 76 MMOL/L
UREA NITROGEN, UR: 400 MG/DL
VANCOMYCIN RANDOM: 18 UG/ML
WBC # BLD: 11.4 K/UL (ref 4.8–10.8)

## 2022-10-12 PROCEDURE — 94761 N-INVAS EAR/PLS OXIMETRY MLT: CPT

## 2022-10-12 PROCEDURE — 2700000000 HC OXYGEN THERAPY PER DAY

## 2022-10-12 PROCEDURE — 2580000003 HC RX 258: Performed by: INTERNAL MEDICINE

## 2022-10-12 PROCEDURE — 6370000000 HC RX 637 (ALT 250 FOR IP): Performed by: HOSPITALIST

## 2022-10-12 PROCEDURE — 1210000000 HC MED SURG R&B

## 2022-10-12 PROCEDURE — 36592 COLLECT BLOOD FROM PICC: CPT

## 2022-10-12 PROCEDURE — 84156 ASSAY OF PROTEIN URINE: CPT

## 2022-10-12 PROCEDURE — 6360000002 HC RX W HCPCS: Performed by: HOSPITALIST

## 2022-10-12 PROCEDURE — 80202 ASSAY OF VANCOMYCIN: CPT

## 2022-10-12 PROCEDURE — 80048 BASIC METABOLIC PNL TOTAL CA: CPT

## 2022-10-12 PROCEDURE — 6370000000 HC RX 637 (ALT 250 FOR IP): Performed by: INTERNAL MEDICINE

## 2022-10-12 PROCEDURE — 6360000002 HC RX W HCPCS: Performed by: INTERNAL MEDICINE

## 2022-10-12 PROCEDURE — 84300 ASSAY OF URINE SODIUM: CPT

## 2022-10-12 PROCEDURE — 83735 ASSAY OF MAGNESIUM: CPT

## 2022-10-12 PROCEDURE — 84540 ASSAY OF URINE/UREA-N: CPT

## 2022-10-12 PROCEDURE — 2580000003 HC RX 258: Performed by: HOSPITALIST

## 2022-10-12 PROCEDURE — 82570 ASSAY OF URINE CREATININE: CPT

## 2022-10-12 PROCEDURE — 82947 ASSAY GLUCOSE BLOOD QUANT: CPT

## 2022-10-12 PROCEDURE — 94640 AIRWAY INHALATION TREATMENT: CPT

## 2022-10-12 PROCEDURE — 85025 COMPLETE CBC W/AUTO DIFF WBC: CPT

## 2022-10-12 PROCEDURE — 97161 PT EVAL LOW COMPLEX 20 MIN: CPT

## 2022-10-12 RX ADMIN — IPRATROPIUM BROMIDE AND ALBUTEROL SULFATE 1 AMPULE: 2.5; .5 SOLUTION RESPIRATORY (INHALATION) at 14:30

## 2022-10-12 RX ADMIN — WATER 1000 MG: 1 INJECTION INTRAMUSCULAR; INTRAVENOUS; SUBCUTANEOUS at 12:42

## 2022-10-12 RX ADMIN — INSULIN LISPRO 6 UNITS: 100 INJECTION, SOLUTION INTRAVENOUS; SUBCUTANEOUS at 17:47

## 2022-10-12 RX ADMIN — IPRATROPIUM BROMIDE AND ALBUTEROL SULFATE 1 AMPULE: 2.5; .5 SOLUTION RESPIRATORY (INHALATION) at 06:24

## 2022-10-12 RX ADMIN — ACETYLCYSTEINE 600 MG: 200 SOLUTION ORAL; RESPIRATORY (INHALATION) at 06:24

## 2022-10-12 RX ADMIN — SODIUM BICARBONATE 650 MG: 650 TABLET ORAL at 17:46

## 2022-10-12 RX ADMIN — INSULIN LISPRO 4 UNITS: 100 INJECTION, SOLUTION INTRAVENOUS; SUBCUTANEOUS at 13:08

## 2022-10-12 RX ADMIN — SODIUM BICARBONATE 650 MG: 650 TABLET ORAL at 08:30

## 2022-10-12 RX ADMIN — SODIUM BICARBONATE 650 MG: 650 TABLET ORAL at 12:43

## 2022-10-12 RX ADMIN — SODIUM CHLORIDE, PRESERVATIVE FREE 10 ML: 5 INJECTION INTRAVENOUS at 09:00

## 2022-10-12 RX ADMIN — SENNOSIDES AND DOCUSATE SODIUM 2 TABLET: 50; 8.6 TABLET ORAL at 08:31

## 2022-10-12 RX ADMIN — INSULIN LISPRO 4 UNITS: 100 INJECTION, SOLUTION INTRAVENOUS; SUBCUTANEOUS at 20:44

## 2022-10-12 RX ADMIN — POLYETHYLENE GLYCOL 3350 17 G: 17 POWDER, FOR SOLUTION ORAL at 08:31

## 2022-10-12 RX ADMIN — VANCOMYCIN HYDROCHLORIDE 750 MG: 750 INJECTION, POWDER, LYOPHILIZED, FOR SOLUTION INTRAVENOUS at 09:40

## 2022-10-12 RX ADMIN — GUAIFENESIN 600 MG: 600 TABLET ORAL at 20:44

## 2022-10-12 RX ADMIN — SODIUM CHLORIDE, PRESERVATIVE FREE 10 ML: 5 INJECTION INTRAVENOUS at 08:32

## 2022-10-12 RX ADMIN — ACETYLCYSTEINE 600 MG: 200 SOLUTION ORAL; RESPIRATORY (INHALATION) at 19:08

## 2022-10-12 RX ADMIN — SODIUM BICARBONATE 650 MG: 650 TABLET ORAL at 20:44

## 2022-10-12 RX ADMIN — ENOXAPARIN SODIUM 30 MG: 100 INJECTION SUBCUTANEOUS at 08:31

## 2022-10-12 RX ADMIN — NEPHROCAP 1 MG: 1 CAP ORAL at 08:31

## 2022-10-12 RX ADMIN — IPRATROPIUM BROMIDE AND ALBUTEROL SULFATE 1 AMPULE: 2.5; .5 SOLUTION RESPIRATORY (INHALATION) at 10:05

## 2022-10-12 RX ADMIN — IPRATROPIUM BROMIDE AND ALBUTEROL SULFATE 1 AMPULE: 2.5; .5 SOLUTION RESPIRATORY (INHALATION) at 19:06

## 2022-10-12 RX ADMIN — SODIUM CHLORIDE, PRESERVATIVE FREE 10 ML: 5 INJECTION INTRAVENOUS at 20:45

## 2022-10-12 RX ADMIN — GUAIFENESIN 600 MG: 600 TABLET ORAL at 08:30

## 2022-10-12 ASSESSMENT — ENCOUNTER SYMPTOMS
COUGH: 0
COLOR CHANGE: 0
DIARRHEA: 0
SHORTNESS OF BREATH: 0
NAUSEA: 0
RHINORRHEA: 0
BACK PAIN: 0
VOMITING: 0
VOICE CHANGE: 0
CONSTIPATION: 0

## 2022-10-12 ASSESSMENT — PAIN SCALES - GENERAL: PAINLEVEL_OUTOF10: 0

## 2022-10-12 NOTE — PROGRESS NOTES
Hospitalist Progress Note    Patient:  Irma Gomez  YOB: 1950  Date of Service: 10/12/2022  MRN: [id number]   Acct: [de-identified]   Primary Care Physician: No primary care provider on file. Advance Directive: Full Code  Admit Date: 10/6/2022       Hospital Day: 6  Referring Provider: Kenn Liz DO    Patient Seen, Chart, Consults, Notes, Labs, Radiology studies reviewed. Subjective:  Irma Gomez is a 70 y.o. female  whom we are following for hypoxemic respiratory failure, CKD 4, hypertension, type 2 diabetes. We did an O2 walk, and she will desaturate to 83 on room air with ambulation. She is reluctant to wear oxygen upon discharge. I will repeat her chest x-ray tomorrow morning.     Allergies:  Pcn [penicillins]    Medicines:  Current Facility-Administered Medications   Medication Dose Route Frequency Provider Last Rate Last Admin    b complex-C-folic acid (NEPHROCAPS) capsule 1 mg  1 capsule Oral Daily Yasmani Mcwilliams MD   1 mg at 10/12/22 0831    epoetin ronaldo-epbx (RETACRIT) injection 10,000 Units  10,000 Units SubCUTAneous Weekly Yasmani Mcwilliams MD   10,000 Units at 10/11/22 1601    sodium bicarbonate tablet 650 mg  650 mg Oral 4x Daily Domonique Hogue MD   650 mg at 10/12/22 1746    insulin lispro (HUMALOG) injection vial 0-8 Units  0-8 Units SubCUTAneous TID WC Domonique Hogue MD   6 Units at 10/12/22 1747    insulin lispro (HUMALOG) injection vial 0-4 Units  0-4 Units SubCUTAneous Nightly Domonique Hogue MD        cefTRIAXone (ROCEPHIN) 1,000 mg in sterile water 10 mL IV syringe  1,000 mg IntraVENous Q24H Kelly Mcgovern MD   1,000 mg at 10/12/22 1242    vancomycin (VANCOCIN) intermittent dosing (placeholder)   Other Yoli Paul MD        [Held by provider] insulin glargine (LANTUS) injection vial 10 Units  10 Units SubCUTAneous BID Domonique Hogue MD   10 Units at 10/11/22 0844    polyethylene glycol (GLYCOLAX) packet 17 g 17 g Oral Daily Juan Jones MD   17 g at 10/12/22 0831    sennosides-docusate sodium (SENOKOT-S) 8.6-50 MG tablet 2 tablet  2 tablet Oral Daily Juan Jones MD   2 tablet at 10/12/22 0831    glucose chewable tablet 16 g  4 tablet Oral PRN Linda Aguirre MD        dextrose bolus 10% 125 mL  125 mL IntraVENous PRN Linda Aguirre MD        Or    dextrose bolus 10% 250 mL  250 mL IntraVENous PRN Linda Aguirre MD        glucagon (rDNA) injection 1 mg  1 mg SubCUTAneous PRN Linda Aguirre MD        dextrose 10 % infusion   IntraVENous Continuous PRN Linda Aguirre MD        sodium chloride flush 0.9 % injection 5-40 mL  5-40 mL IntraVENous 2 times per day Juan Jones MD   10 mL at 10/12/22 0900    sodium chloride flush 0.9 % injection 10 mL  10 mL IntraVENous PRN Juan Jones MD        0.9 % sodium chloride infusion   IntraVENous PRN Juan Jones MD        enoxaparin Sodium (LOVENOX) injection 30 mg  30 mg SubCUTAneous Daily Juan Jones MD   30 mg at 10/12/22 0831    ondansetron (ZOFRAN-ODT) disintegrating tablet 4 mg  4 mg Oral Q8H PRN Juan Jones MD        Or    ondansetron [facility]) injection 4 mg  4 mg IntraVENous Q6H PRN Juan Jones MD        polyethylene glycol (GLYCOLAX) packet 17 g  17 g Oral Daily PRN Juan Jones MD        acetaminophen (TYLENOL) tablet 650 mg  650 mg Oral Q6H PRN Juan Jones MD        Or    acetaminophen (TYLENOL) suppository 650 mg  650 mg Rectal Q6H PRN Juan Jones MD        ipratropium-albuterol (DUONEB) nebulizer solution 1 ampule  1 ampule Inhalation Q4H WA Juan Jones MD   1 ampule at 10/12/22 1430    acetylcysteine (MUCOMYST) 20 % solution 600 mg  600 mg Inhalation BID Juan Jones MD   600 mg at 10/12/22 3891    guaiFENesin [facility]) extended release tablet 600 mg  600 mg Oral BID Juan Jones MD   600 mg at 10/12/22 0877       Past Medical History:  Past Medical History:   Diagnosis Date    Chronic kidney disease     stage 4 not receiving dialysis    Diabetes mellitus ([facility] [address]     type 1 dx 1963    Gallstones     Hypertension     Lactose intolerance     Osteopenia     UTI (urinary tract infection)     in ICU in April with UTI       Past Surgical History:  Past Surgical History:   Procedure Laterality Date    CARPAL TUNNEL RELEASE Bilateral     CATARACT REMOVAL      FRACTURE SURGERY         Family History  History reviewed. No pertinent family history. Social History  Social History     Socioeconomic History    Marital status:      Spouse name: Not on file    Number of children: Not on file    Years of education: Not on file    Highest education level: Not on file   Occupational History    Not on file   Tobacco Use    Smoking status: Never    Smokeless tobacco: Never   Substance and Sexual Activity    Alcohol use: Never    Drug use: Never    Sexual activity: Not on file   Other Topics Concern    Not on file   Social History Narrative    Not on file     Social Determinants of Health     Financial Resource Strain: Not on file   Food Insecurity: Not on file   Transportation Needs: Not on file   Physical Activity: Not on file   Stress: Not on file   Social Connections: Not on file   Intimate Partner Violence: Not on file   Housing Stability: Not on file         Review of Systems:    Review of Systems   Constitutional:  Negative for activity change and fatigue. HENT:  Negative for rhinorrhea and voice change. Eyes:  Negative for visual disturbance. Respiratory:  Negative for cough and shortness of breath. Cardiovascular:  Negative for chest pain and leg swelling. Gastrointestinal:  Negative for constipation, diarrhea, nausea and vomiting. Endocrine: Negative for polyuria. Genitourinary:  Negative for difficulty urinating and dysuria. Musculoskeletal:  Negative for arthralgias and back pain.    Skin:  Negative for color change. Allergic/Immunologic: Negative for immunocompromised state. Neurological:  Negative for dizziness and headaches. Psychiatric/Behavioral:  Negative for confusion. Objective:  Blood pressure 135/71, pulse 97, temperature 97.5 °F (36.4 °C), resp. rate 20, height 5' 4\" (1.626 m), weight 175 lb (79.4 kg), SpO2 93 %. Intake/Output Summary (Last 24 hours) at 10/12/2022 1851  Last data filed at 10/12/2022 1530  Gross per 24 hour   Intake 690 ml   Output 1500 ml   Net -810 ml       Physical Exam  Vitals and nursing note reviewed. Constitutional:       Appearance: She is ill-appearing. HENT:      Head: Normocephalic and atraumatic. Right Ear: External ear normal.      Left Ear: External ear normal.      Nose: Nose normal.      Mouth/Throat:      Mouth: Mucous membranes are moist.   Eyes:      Conjunctiva/sclera: Conjunctivae normal.      Pupils: Pupils are equal, round, and reactive to light. Cardiovascular:      Rate and Rhythm: Normal rate and regular rhythm. Heart sounds: Normal heart sounds. Pulmonary:      Effort: Pulmonary effort is normal.      Breath sounds: Normal breath sounds. Abdominal:      General: Abdomen is flat. Palpations: Abdomen is soft. Musculoskeletal:         General: Normal range of motion. Cervical back: Neck supple. No rigidity. No muscular tenderness. Skin:     General: Skin is warm and dry. Neurological:      Mental Status: She is alert and oriented to person, place, and time.    Psychiatric:         Mood and Affect: Mood normal.       Labs:  BMP:   Recent Labs     10/10/22  0300 10/11/22  0300 10/12/22  0530   * 146* 144   K 4.5 4.1 4.0  4.0   * 117* 113*   CO2 17* 18* 19*   BUN 46* 38* 35*   CREATININE 2.6* 2.3* 2.0*   CALCIUM 8.3* 8.4* 8.7*     CBC:   Recent Labs     10/10/22  0300 10/11/22  0300 10/12/22  0530   WBC 17.3* 12.5* 11.4*   HGB 9.3* 9.0* 9.2*   HCT 29.1* 28.4* 29.3*   .9* 102.9* 102.8*   PLT 121* 124* 115*     LIVER PROFILE: No results for input(s): AST, ALT, LIPASE, BILIDIR, BILITOT, ALKPHOS in the last 72 hours. Invalid input(s): AMYLASE,  ALB  PT/INR: No results for input(s): PROTIME, INR in the last 72 hours. APTT: No results for input(s): APTT in the last 72 hours. BNP:  No results for input(s): BNP in the last 72 hours. Ionized Calcium:No results for input(s): IONCA in the last 72 hours. Magnesium:  Recent Labs     10/10/22  0300 10/11/22  0300 10/12/22  0530   MG 1.5* 1.8 1.7     Phosphorus:No results for input(s): PHOS in the last 72 hours. HgbA1C: No results for input(s): LABA1C in the last 72 hours. Hepatic: No results for input(s): ALKPHOS, ALT, AST, PROT, BILITOT, BILIDIR, LABALBU in the last 72 hours. Lactic Acid: No results for input(s): LACTA in the last 72 hours. Troponin: No results for input(s): CKTOTAL, CKMB, TROPONINT in the last 72 hours. ABGs: No results for input(s): PH, PCO2, PO2, HCO3, O2SAT in the last 72 hours. CRP:  No results for input(s): CRP in the last 72 hours. Sed Rate:  No results for input(s): SEDRATE in the last 72 hours. Cultures:   No results for input(s): CULTURE in the last 72 hours. No results for input(s): BC, Danetta Bustard in the last 72 hours. No results for input(s): CXSURG in the last 72 hours. Radiology reports as per the Radiologist  Radiology: XR ABDOMEN (KUB) (SINGLE AP VIEW)    Result Date: 10/7/2022  Clinical history: Abdominal pain Finding: The bowel gas pattern is normal.There is fecal stasis suggesting of constipation. No evidence of organomegaly or abnormal calcifications is seen. The osseous structures of the abdomen and pelvis appear to be normal.There is mild atherosclerotic change of both iliac arteries with calcified plaque. There is a catheter seen in the pelvic region. .    Non-specific gas pattern.  Fecal stasis suggesting of constipation    NM LUNG SCAN PERFUSION ONLY    Result Date: 10/12/2022  NO PRIOR REPORT AVAILABLE EXAM: NUCLEAR MEDICINE V/Q SCAN. CLINICAL DATA:Respiratory failure. TECHNIQUE: Nuclear medicine VQ scan was performed OQKP4.2 millicuries of technetium 99m MAA for the perfusion study. PRIOR STUDIES: No prior studies submitted FINDINGS: There is homogenousdistribution of radiotracer in both lungs on the perfusion images. No large wedge-shaped unmatched defects are identified. The study is low probability for pulmonary embolism. Please note according to the Piopped criteria a low probability reflects a 0 to 19%chance of a pulmonary embolism. Please correlate clinically. If symptoms persist, consider CT PA gram. RECOMMENDATION: Follow up as clinically indicated. Electronically Signed by Karen Rodriguez MD at 12-Oct-2022 10:07:26 AM             XR CHEST PORTABLE    Result Date: 10/7/2022  Patient: Ralph Million  Time Out: 02:24Exam(s): FILM CXR 1 VIEW EXAM:  XR Chest, 1 ViewCLINICAL HISTORY:   Reason for exam: central line placement. TECHNIQUE:  Frontal view of the chest.COMPARISON:  10/6/2022    Right central line in the upper SVCElectronically signed by Tahir Pete MD on 10-07-22 at 0224    XR CHEST PORTABLE    Result Date: 10/6/2022  Patient: Ralph Million  Time Out: 23:43Exam(s): FILM CXR 1 VIEW EXAM:  XR Chest, 1 ViewCLINICAL HISTORY:   Reason for exam: shortness of breath. TECHNIQUE:  Frontal view of the chest.COMPARISON:  No relevant prior studies available. FINDINGS:  Lungs:  Airspace opacities within the mid to lower lungs likely infectious. Pleural space:  Unremarkable. Heart:  Unremarkable. Mediastinum:  Unremarkable. Bones/joints:  Unremarkable. Airspace opacities within the mid to lower lungs likely infectious. Electronically signed by Phyllis Hernandez MD on 10-06-22 at 2343       Assessment     Sepsis. Resolved. Stage IV CKD. Continue supportive care. Hypoxemic respiratory failure. Repeat chest x-ray 10/13.       Davis Holder DO

## 2022-10-12 NOTE — PROGRESS NOTES
Infectious Diseases Progress Note    Patient:  Britta Pina  YOB: 1950  MRN: [id number]   Admit date: 10/6/2022   Admitting Physician: Nikita Vazquez DO  Primary Care Physician: No primary care provider on file. Chief Complaint/Interval History: She seems to be feeling better. She is gaining strength. No fever. No new symptoms. No abdominal pain. No diarrhea. No rash or skin itching. No cough or shortness of breath. Note was initiated around the time I saw her on October 12, 2022. Note completed as a late entry on October 13, 2022. In/Out    Intake/Output Summary (Last 24 hours) at 10/12/2022 1645  Last data filed at 10/12/2022 1530  Gross per 24 hour   Intake 690 ml   Output 1500 ml   Net -810 ml     Allergies:    Allergies   Allergen Reactions    Pcn [Penicillins]      Current Meds: b complex-C-folic acid (NEPHROCAPS) capsule 1 mg, Daily  epoetin ronaldo-epbx (RETACRIT) injection 10,000 Units, Weekly  sodium bicarbonate tablet 650 mg, 4x Daily  insulin lispro (HUMALOG) injection vial 0-8 Units, TID WC  insulin lispro (HUMALOG) injection vial 0-4 Units, Nightly  cefTRIAXone (ROCEPHIN) 1,000 mg in sterile water 10 mL IV syringe, Q24H  vancomycin (VANCOCIN) intermittent dosing (placeholder), RX Placeholder  [Held by provider] insulin glargine (LANTUS) injection vial 10 Units, BID  polyethylene glycol (GLYCOLAX) packet 17 g, Daily  sennosides-docusate sodium (SENOKOT-S) 8.6-50 MG tablet 2 tablet, Daily  glucose chewable tablet 16 g, PRN  dextrose bolus 10% 125 mL, PRN   Or  dextrose bolus 10% 250 mL, PRN  glucagon (rDNA) injection 1 mg, PRN  dextrose 10 % infusion, Continuous PRN  sodium chloride flush 0.9 % injection 5-40 mL, 2 times per day  sodium chloride flush 0.9 % injection 10 mL, PRN  0.9 % sodium chloride infusion, PRN  enoxaparin Sodium (LOVENOX) injection 30 mg, Daily  ondansetron (ZOFRAN-ODT) disintegrating tablet 4 mg, Q8H PRN   Or  ondansetron (ZOFRAN) injection 4 mg, Q6H PRN  polyethylene glycol (GLYCOLAX) packet 17 g, Daily PRN  acetaminophen (TYLENOL) tablet 650 mg, Q6H PRN   Or  acetaminophen (TYLENOL) suppository 650 mg, Q6H PRN  ipratropium-albuterol (DUONEB) nebulizer solution 1 ampule, Q4H WA  acetylcysteine (MUCOMYST) 20 % solution 600 mg, BID  guaiFENesin (MUCINEX) extended release tablet 600 mg, BID      Review of Systems    VitalSigns:  /71   Pulse 97   Temp 97.5 °F (36.4 °C)   Resp 20 Comment: 97%  Ht 5' 4\" (1.626 m)   Wt 175 lb (79.4 kg)   SpO2 93%   BMI 30.04 kg/m²      Physical Exam  Line/IV site: No erythema, warmth, induration, or tenderness. Lab Results:  CBC:   Recent Labs     10/10/22  0300 10/11/22  0300 10/12/22  0530   WBC 17.3* 12.5* 11.4*   HGB 9.3* 9.0* 9.2*   * 124* 115*     BMP:  Recent Labs     10/10/22  0300 10/11/22  0300 10/12/22  0530   * 146* 144   K 4.5 4.1 4.0  4.0   * 117* 113*   CO2 17* 18* 19*   BUN 46* 38* 35*   CREATININE 2.6* 2.3* 2.0*   GLUCOSE 150* 59* 146*     CultureResults:  Blood cultures October 7, 2022-no growth  Blood cultures October 7, 2022-Streptococcus allolyticus  Blood cultures October 9, 2022 no growth  Blood cultures October 9, 2022-no growth    Radiology: Still no report on CT scan abdomen and pelvis. Per epic appears study was completed on October 9, 2022. Additional Studies Reviewed:    Conclusions  Summary  Normal left ventricular size with normal LV function and an estimated ejection fraction of approximately 70-75%. No regional wall  motion abnormalities. Normal left ventricular wall thickness. Grade II diastolic dysfunction with evidence for increased LVEDP. Normal right ventricular size with preserved RV function (TAPSE 28 mm). Normal bi-atrial size. Mild mitral valve stenosis (mean gradient 5 mmHg at  bpm). Mild tricuspid regurgitation with estimated RVSP of 43 mm Hg, suggestive for mild pulmonary hypertension.   Aortic root and ascending aorta are within normal limits. No evidence of significant pericardial effusion is noted. The rhythm is sinus. Hemodynamic parameters: Normal SVI 40 ml/m2 and CI 3.9 L/min/m2 (average HR 98 bpm). No previous studies. Signature  Electronically signed by Cedrick Barger(Interpreting physician) on 10/07/2022 09:00 AM    Impression:  1. Appears to have had transient bacteremia with Streptococcus-urinary source was suspected  2. Diabetes mellitus  3. Nausea/vomiting improved  4.   Acute on chronic renal insufficiency-improving    Recommendations:  She seems to be improving  Continue ceftriaxone  If ongoing improvement possible transition to oral antibiotic treatment on Friday, October 14  Continue to follow    Sam Cancino MD

## 2022-10-12 NOTE — CARE COORDINATION
Met with patient to discuss discharge plan. Patient states she does not want any assistance in discharging to a Shelter or SNF. Patient and her  are discussing divorce. Patient plans to have her  transport her to their boat at [place] to retrieve her belongings. If her  can not provide transportation, she will hire a cab. She then plans to return to [place] to return to a hotel until she feels strong enough to travel home to [place], [place]. She states that she has good support from her [employer] group, [address] [place] [address]. She has received support previously from them during other separations from her . Discussed possibility that patient may need home oxygen at discharge. Patient is convinced she will not need oxygen when she is discharged. She states \"I know all about oxygen because my  son required oxygen. \"  She states if she needs oxygen at discharge she will pay for a small portable concentrator. CM will continue to follow.   Electronically signed by Alejandra Vega RN on 10/12/2022 at 9:45 AM

## 2022-10-12 NOTE — PROGRESS NOTES
14:20p - Home oxygen evaluation performed and completed and results(at this time) are as follows: SaO2 = 88% on R/A at rest, SaO2 = 94% at rest, SaO2 = 83% on R/A while ambulating, SaO2 = 94% on 2 lpm O2(NC) while ambulating(Recovery SaO2).  TS RRT

## 2022-10-12 NOTE — PROGRESS NOTES
[facility] PHYSICAL THERAPY EVALUATION      [place]    : 1950  MRN: [id number]   PHYSICIAN:  Muriel Walker DO  Primary Problem    Patient Active Problem List   Diagnosis    Respiratory failure [facility])       Rehabilitation Diagnosis:     Respiratory failure ([facility] [address] [J96.90]  Hyperkalemia [E87.5]  Severe sepsis ([facility] [address] [A41.9, R65.20]  Acute respiratory failure with hypoxia and hypercapnia ([facility] [address] [J96.01, J96.02]  Community acquired pneumonia of right lower lobe of lung [J18.9]       SERVICE DATE: 10/12/2022        SUBJECTIVE: Patient agrees that they are safe with mobility and do not require physical therapy services. Pain: No complaint of pain    OBJECTIVE:    Orientation is Within normal limits           ACTIVE ROM:       All major lower extremity joints are within functional limits      STRENGTH     Both lower extremities are grossly within functional limits. TRANSFERS   Sit to stand   Independent      Bed to chair   Independent     Bed to mobility   Supine to sit   Independent       Roll     Independent     Scoot Side to side / Up and down   Independent    AMBULATION   Distance: Functional distances     Device: NONE     Assistance: Independent       Comment: O2    BALANCE   Sitting    Good     Standing    Good    ASSESSMENT      Independent and safe with all functional mobility. No skilled physical therapy needs identified at this time. PLAN: Discharge from skilled physical therapy. Nursing has been updated.       GOALS   Independent and safe with all functional mobility (MET)            Electronically signed by Mari Sena PT on 10/12/2022 at 10:01 AM

## 2022-10-12 NOTE — PROGRESS NOTES
[address] [facility] Pharmacokinetic Monitoring Service - Vancomycin    Consulting Provider: Dr. Kristie Rivera   Indication: sepsis, bloodstream infection  Target Concentration: Dosing based on anticipated concentration <15 mg/L due to renal impairment/insufficiency  Day of Therapy: 6  Additional Antimicrobials: Ceftriaxone    Pertinent Laboratory Values: Wt Readings from Last 1 Encounters:   10/12/22 175 lb (79.4 kg)     Temp Readings from Last 1 Encounters:   10/12/22 97.4 °F (36.3 °C)     Estimated Creatinine Clearance: 26 mL/min (A) (based on SCr of 2 mg/dL (H)). Recent Labs     10/11/22  0300 10/12/22  0530   CREATININE 2.3* 2.0*   WBC 12.5* 11.4*     Procalcitonin: last level = 0.48 on 10/06/22    Pertinent Cultures:  Culture Date Source Results   10/07/22 Blood Staph alactolyticus   10/07/22 Blood  No growth to date   10/07/22 Urine Enterococcus faecium   10/09/22 Blood x 2  No growth to date     MRSA Nasal Swab: N/A. Non-respiratory infection. Recent vancomycin administrations                     vancomycin (VANCOCIN) 750 mg in sodium chloride 0.9 % 250 mL IVPB (mg) 750 mg New Bag 10/11/22 0557    vancomycin (VANCOCIN) 750 mg in sodium chloride 0.9 % 250 mL IVPB (mg) 750 mg New Bag 10/10/22 0529                    Assessment:  Date/Time Current Dose Concentration Timing of Concentration (h) AUC   10/12 at 0530 Vancomycin pulse dosing  18.0 ~23 hours 30 minutes    Note: Serum concentrations collected for AUC dosing may appear elevated if collected in close proximity to the dose administered, this is not necessarily an indication of toxicity    Plan:  Current dosing regimen is therapeutic  Give Vancomycin 750 mg IV x 1 dose today.   Repeat vancomycin concentration ordered for 10/13 @ 0800   Pharmacy will continue to monitor patient and adjust therapy as indicated    Thank you for the consult,  ODIN Odell CORINNE [facility]  10/12/2022 6:59 AM

## 2022-10-12 NOTE — PROGRESS NOTES
Nephrology ([facility]) progress note      Patient:  Cy Pimentel  YOB: 1950  Date of Service: 10/12/2022  MRN: [id number]   Acct: [de-identified]   Primary Care Physician: No primary care provider on file. Advance Directive: Full Code  Admit Date: 10/6/2022       Hospital Day: 6  Referring Provider: Kaylah Rodriguez DO    Patient independently seen and examined, Chart, Consults, Notes, Operative notes, Labs, Cardiology, and Radiology studies reviewed as available. Subjective:  Cy Pimentel is a 70 y.o. female for whom we were consulted for evaluation and treatment of acute on chronic kidney disease. Patient recalls following with nephrologist in [place] for chronic kidney disease. She is unable to quantify the GFR or creatinine. She was admitted for nausea, vomiting, cystitis. She denied hematuria, hemoptysis, rash, chest pain but has shortness of air at rest. She tolerated Lokelma without issue with subsequent improvement in potassium. Up in chair and feeling better. She was anxious for discharge. She remains on O2 and is using some spirometry. Today, she is doing better and is anxious for discharge. Today, no overnight events.       Allergies:  Pcn [penicillins]    Medicines:  Current Facility-Administered Medications   Medication Dose Route Frequency Provider Last Rate Last Admin    b complex-C-folic acid (NEPHROCAPS) capsule 1 mg  1 capsule Oral Daily Kathi Macario MD   1 mg at 10/12/22 0831    epoetin ronaldo-epbx (RETACRIT) injection 10,000 Units  10,000 Units SubCUTAneous Weekly Kathi Macario MD   10,000 Units at 10/11/22 1601    sodium bicarbonate tablet 650 mg  650 mg Oral 4x Daily Дмитрий Barrera MD   650 mg at 10/12/22 0830    insulin lispro (HUMALOG) injection vial 0-8 Units  0-8 Units SubCUTAneous TID WC Дмитрий Barrera MD   6 Units at 10/11/22 1827    insulin lispro (HUMALOG) injection vial 0-4 Units  0-4 Units SubCUTAneous Nightly Patricia Jones MD        cefTRIAXone (ROCEPHIN) 1,000 mg in sterile water 10 mL IV syringe  1,000 mg IntraVENous Q24H Daniela eLes MD   1,000 mg at 10/11/22 1602    vancomycin (VANCOCIN) intermittent dosing (placeholder)   Other RX Placeholder Daniela Lees MD        [Held by provider] insulin glargine (LANTUS) injection vial 10 Units  10 Units SubCUTAneous BID Patricia Jones MD   10 Units at 10/11/22 0844    polyethylene glycol (GLYCOLAX) packet 17 g  17 g Oral Daily Patricia Jones MD   17 g at 10/12/22 0831    sennosides-docusate sodium (SENOKOT-S) 8.6-50 MG tablet 2 tablet  2 tablet Oral Daily Patricia Jones MD   2 tablet at 10/12/22 0831    glucose chewable tablet 16 g  4 tablet Oral PRN Emerald Mckay MD        dextrose bolus 10% 125 mL  125 mL IntraVENous PRN Emerald Mckay MD        Or    dextrose bolus 10% 250 mL  250 mL IntraVENous PRN Emerald Mckay MD        glucagon (rDNA) injection 1 mg  1 mg SubCUTAneous PRN Emerald Mckay MD        dextrose 10 % infusion   IntraVENous Continuous PRN Emerald Mckay MD        sodium chloride flush 0.9 % injection 5-40 mL  5-40 mL IntraVENous 2 times per day Patricia Jones MD   10 mL at 10/12/22 8066    sodium chloride flush 0.9 % injection 10 mL  10 mL IntraVENous PRN Patricia Jones MD        0.9 % sodium chloride infusion   IntraVENous PRN Patricia Jones MD        enoxaparin Sodium (LOVENOX) injection 30 mg  30 mg SubCUTAneous Daily Patricia Jones MD   30 mg at 10/12/22 0831    ondansetron (ZOFRAN-ODT) disintegrating tablet 4 mg  4 mg Oral Q8H PRN Patricia Jones MD        Or    ondansetron [facility]) injection 4 mg  4 mg IntraVENous Q6H PRN Patricia Jones MD        polyethylene glycol (GLYCOLAX) packet 17 g  17 g Oral Daily PRN Patricia Jones MD        acetaminophen (TYLENOL) tablet 650 mg  650 mg Oral Q6H PRN Patricia Jones MD        Or acetaminophen (TYLENOL) suppository 650 mg  650 mg Rectal Q6H PRN Hiren Sandoval MD        ipratropium-albuterol (DUONEB) nebulizer solution 1 ampule  1 ampule Inhalation Q4H WA Hiren Sandoval MD   1 ampule at 10/12/22 1385    acetylcysteine (MUCOMYST) 20 % solution 600 mg  600 mg Inhalation BID Hiren Sandoval MD   600 mg at 10/12/22 0960    guaiFENesin [facility]) extended release tablet 600 mg  600 mg Oral BID Hiren Sandoval MD   600 mg at 10/12/22 0830       Past Medical History:  Past Medical History:   Diagnosis Date    Chronic kidney disease     stage 4 not receiving dialysis    Diabetes mellitus ([facility] [address]     type 1 dx 1963    Gallstones     Hypertension     Lactose intolerance     Osteopenia     UTI (urinary tract infection)     in ICU in April with UTI       Past Surgical History:  Past Surgical History:   Procedure Laterality Date    CARPAL TUNNEL RELEASE Bilateral     CATARACT REMOVAL      FRACTURE SURGERY         Family History  History reviewed. No pertinent family history.     Social History  Social History     Socioeconomic History    Marital status:      Spouse name: Not on file    Number of children: Not on file    Years of education: Not on file    Highest education level: Not on file   Occupational History    Not on file   Tobacco Use    Smoking status: Never    Smokeless tobacco: Never   Substance and Sexual Activity    Alcohol use: Never    Drug use: Never    Sexual activity: Not on file   Other Topics Concern    Not on file   Social History Narrative    Not on file     Social Determinants of Health     Financial Resource Strain: Not on file   Food Insecurity: Not on file   Transportation Needs: Not on file   Physical Activity: Not on file   Stress: Not on file   Social Connections: Not on file   Intimate Partner Violence: Not on file   Housing Stability: Not on file         Review of Systems:  History obtained from chart review and the patient  General ROS: No fever or chills  Respiratory ROS: No cough, +shortness of breath, +wheezing  Cardiovascular ROS: No chest pain or palpitations  Gastrointestinal ROS: No abdominal pain or melena  Genito-Urinary ROS: No dysuria, no hematuria  Musculoskeletal ROS: No joint pain or swelling   14 point ROS reviewed with the patient and negative except as noted above and in the HPI unless unable to obtain. Objective:  Patient Vitals for the past 24 hrs:   BP Temp Temp src Pulse Resp SpO2 Weight   10/12/22 0759 -- -- -- 92 -- -- --   10/12/22 0727 130/65 97.8 °F (36.6 °C) -- 93 20 -- --   10/12/22 0624 -- -- -- 90 18 93 % --   10/12/22 0506 132/63 97.4 °F (36.3 °C) -- 92 16 93 % 175 lb (79.4 kg)   10/12/22 0400 -- -- Temporal -- -- -- --   10/12/22 0000 -- -- Temporal -- -- -- --   10/11/22 2056 (!) 157/70 97.6 °F (36.4 °C) Temporal (!) 113 18 97 % --   10/11/22 2000 -- -- Temporal -- -- -- --   10/11/22 1702 (!) 141/76 97.5 °F (36.4 °C) Temporal (!) 105 17 92 % --   10/11/22 1536 -- -- -- -- -- 95 % --         Intake/Output Summary (Last 24 hours) at 10/12/2022 1048  Last data filed at 10/12/2022 1040  Gross per 24 hour   Intake 440 ml   Output 600 ml   Net -160 ml       General: awake/alert   Chest:  Scattered fin erales  CVS: regular rate and rhythm  Abdominal: soft, nontender, normal bowel sounds  Extremities: no cyanosis but trace leg edema  Skin: warm and dry without rash      Labs:  BMP:   Recent Labs     10/10/22  0300 10/11/22  0300 10/12/22  0530   * 146* 144   K 4.5 4.1 4.0  4.0   * 117* 113*   CO2 17* 18* 19*   BUN 46* 38* 35*   CREATININE 2.6* 2.3* 2.0*   CALCIUM 8.3* 8.4* 8.7*       CBC:   Recent Labs     10/10/22  0300 10/11/22  0300 10/12/22  0530   WBC 17.3* 12.5* 11.4*   HGB 9.3* 9.0* 9.2*   HCT 29.1* 28.4* 29.3*   .9* 102.9* 102.8*   * 124* 115*       LIVER PROFILE:   No results for input(s): AST, ALT, LIPASE, BILIDIR, BILITOT, ALKPHOS in the last 72 hours. Invalid input(s):   AMYLASE, ALB    PT/INR: No results for input(s): PROTIME, INR in the last 72 hours. APTT: No results for input(s): APTT in the last 72 hours. BNP:  No results for input(s): BNP in the last 72 hours. Ionized Calcium:No results for input(s): IONCA in the last 72 hours. Magnesium:  Recent Labs     10/10/22  0300 10/11/22  0300 10/12/22  0530   MG 1.5* 1.8 1.7       Phosphorus:  No results for input(s): PHOS in the last 72 hours. HgbA1C: No results for input(s): LABA1C in the last 72 hours. Hepatic:   No results for input(s): ALKPHOS, ALT, AST, PROT, BILITOT, BILIDIR, LABALBU in the last 72 hours. Lactic Acid:   No results for input(s): LACTA in the last 72 hours. Troponin: No results for input(s): CKTOTAL, CKMB, TROPONINT in the last 72 hours. ABGs: No results for input(s): PH, PCO2, PO2, HCO3, O2SAT in the last 72 hours. CRP:  No results for input(s): CRP in the last 72 hours. Sed Rate:  No results for input(s): SEDRATE in the last 72 hours. Cultures:   No results for input(s): CULTURE in the last 72 hours. Recent Labs     10/09/22  1220 10/09/22  1312   BC No Growth to date. Any change in status will be called. --    BLOODCULT2  --  No Growth to date. Any change in status will be called. No results for input(s): CXSURG in the last 72 hours. Radiology reports as per the Radiologist  Radiology: XR ABDOMEN (KUB) (SINGLE AP VIEW)    Result Date: 10/7/2022  Clinical history: Abdominal pain Finding: The bowel gas pattern is normal.There is fecal stasis suggesting of constipation. No evidence of organomegaly or abnormal calcifications is seen. The osseous structures of the abdomen and pelvis appear to be normal.There is mild atherosclerotic change of both iliac arteries with calcified plaque. There is a catheter seen in the pelvic region. .    Non-specific gas pattern.  Fecal stasis suggesting of constipation    XR CHEST PORTABLE    Result Date: 10/7/2022  Patient: Ra Singleton  Time Out: 02: 24Exam(s): FILM CXR 1 VIEW EXAM:  XR Chest, 1 ViewCLINICAL HISTORY:   Reason for exam: central line placement. TECHNIQUE:  Frontal view of the chest.COMPARISON:  10/6/2022    Right central line in the upper SVCElectronically signed by Sai Wright MD on 10-07-22 at 0224    XR CHEST PORTABLE    Result Date: 10/6/2022  Patient: Arnold Wren  Time Out: 23:43Exam(s): FILM CXR 1 VIEW EXAM:  XR Chest, 1 ViewCLINICAL HISTORY:   Reason for exam: shortness of breath. TECHNIQUE:  Frontal view of the chest.COMPARISON:  No relevant prior studies available. FINDINGS:  Lungs:  Airspace opacities within the mid to lower lungs likely infectious. Pleural space:  Unremarkable. Heart:  Unremarkable. Mediastinum:  Unremarkable. Bones/joints:  Unremarkable. Airspace opacities within the mid to lower lungs likely infectious. Electronically signed by Abi Townsend MD on 10-06-22 at 2343    ECHO 2D [address]    Result Date: 10/7/2022  Transthoracic Echocardiography Report (TTE)  Demographics   Patient Name   Arnold Wren  Date of Study           10/07/2022   MRN            [id number]        Gender                  Female   Date of Birth  1950    Room Number             [id number]   Age            70 year(s)   Height:        64 inches     Referring Physician     Salvador Renner   Weight:        155 pounds    Sonographer             Yesenia Milan   BSA:           1.76 m^2      Interpreting Physician  Aimee Pedraza   BMI:           26.61 kg/m^2  Procedure Type of Study   TTE procedure:ECHO 2D W/DOPPLER/COLOR/CONTRAST. Study Location: Portable Technical Quality: Limited visualization due to poor acoustical window. Patient Status: Inpatient Contrast Medium: Definity. HR: 98 bpm BP: 74/52 mmHg Indications:Dyspnea/SOB. Conclusions   Summary  Normal left ventricular size with normal LV function and an estimated  ejection fraction of approximately 70-75%. No regional wall motion  abnormalities.  Normal left ventricular wall thickness. Grade II diastolic  dysfunction with evidence for increased LVEDP. Normal right ventricular size with preserved RV function (TAPSE 28 mm). Normal bi-atrial size. Mild mitral valve stenosis (mean gradient 5 mmHg at  bpm). Mild tricuspid regurgitation with estimated RVSP of 43 mm Hg, suggestive  for mild pulmonary hypertension. Aortic root and ascending aorta are within normal limits. No evidence of significant pericardial effusion is noted. The rhythm is sinus. Hemodynamic parameters: Normal SVI 40 ml/m2 and CI 3.9 L/min/m2 (average  HR 98 bpm). No previous studies. Signature   ----------------------------------------------------------------  Electronically signed by Serafin Barger(Interpreting physician)  on 10/07/2022 09:00 AM  ----------------------------------------------------------------   Findings   Mitral Valve  Mildly sclerosed posterior annulus and leaflet of the mitral valve with  mildly reduced leaflet mobility. Mild mitral valve stenosis (mean gradient  5 mmHg at  bpm). Trivial mitral regurgitation. Aortic Valve  Aortic valve appears to be tricuspid. Structurally normal aortic valve. No significant aortic regurgitation or stenosis is noted. Tricuspid Valve  Tricuspid valve is structurally normal.  Mild tricuspid regurgitation with estimated RVSP of 43 mm Hg, suggestive  for mild pulmonary hypertension. Pulmonic Valve  The pulmonic valve was not well visualized. No evidence of pulmonic stenosis. No evidence of pulmonic regurgitation. Left Atrium  Normal size left atrium. Left Ventricle  Normal left ventricular size with normal LV function and an estimated  ejection fraction of approximately 70-75%. No regional wall motion abnormalities. Normal left ventricular wall thickness. No evidence of left ventricular mass or thrombus noted. Grade II diastolic dysfunction with evidence for increased LVEDP.    Right Atrium  Normal right atrial dimension with no evidence of thrombus or mass noted. Right Ventricle  Normal right ventricular size with preserved RV function (TAPSE 28 mm). Pericardial Effusion  No evidence of significant pericardial effusion is noted. Pleural Effusion  No evidence of pleural effusion. Miscellaneous  Aortic root and ascending aorta are within normal limits. The IVC is normal.  Doppler of the pulmonary veins shows normal flow. Definity contrast was utilized to enhance the endocardial borders. The rhythm is sinus. Hemodynamic parameters: SVI 40 ml/m2 and CI 3.9 L/min/m2 (average HR 98  bpm). 2D Measurements and Calculations(cm)   LVIDd: 4.55 cm                      LVIDs: 3.28 cm  IVSd: 0.98 cm  LVPWd: 0.97 cm                      AO Root Dimension: 2.2 cm  Rt. Vent.  Dimension: 2.44 cm        LA Dimension: 3.1 cm  % Ejection Fraction: 55 %           LA Area: 12.1 cm^2  LA Volume: 22.5 ml                  LV Systolic dimension: 0.07CY  LA Volume Index: 13 ml/m^2          LV PW diastolic: 4.34MX  LV dimension: 4.55 cm               LVOT diameter: 2 cm                                      RA Systolic pressure: 3mmHg  LVEDV: 85.1 ml                      RV Diastolic dimension: 8.35BX  LVESV:25.8 ml                       LVEDVI: 48 ml/m^2  Cardic Output (CO): 6.83l/min       LVESVI: 15 ml/m^2  Ascending Aorta: 2.5 cm  Doppler Measurements and Calculations   AV Peak Velocity:196 cm/s              MV Peak E-Wave: 104 cm/s  AV Mean Velocity:130 cm/s              MV Peak A-Wave: 115 cm/s  AV Peak Gradient: 15.37 mmHg           MV E/A Ratio: 0.9 %  AV Mean Gradient: 8 mmHg               MV Mean velocity: 105cm/s  AV Area (Continuity):1.83 cm^2         MV Peak Gradient: 4.33 mmHg  AV VTI:38 cm/s                         MV Mean gradient: 5 mmHg  TR Velocity:297 cm/s                   MV P1/2t: 74 msec  TR Gradient:35.28 mmHg                 MVA by PHT2.97 cm^2  Estimated RAP:8 mmHg  RVSP:43 mmHg   MV E' septal velocity: 5.77cm/s MV E' lateral velocity:8.38 cm/s         Assessment   -Acute kidney injury-ATN  -Chronic kidney disease stage IV   -Hyperkalemia  -Acute cystitis to E faecium  -Sepsis  -Metabolic acidosis  -Diabetes type 1 with renal disease  -Hypernatremia  -Acute hypoxemic resp failure  -Anemia of CKD  -Elevated vanco trough  -Pneumonia      Plan:  Discussed with patient  Work-up reviewed to date  Monitor labs  Potassium better   Continue oral alkali   Antibiotics per ID (Vanco)  Encouraged oral water intake  MVI and Retacrit    Quang Mark MD  10/12/22  10:48 AM

## 2022-10-13 ENCOUNTER — APPOINTMENT (OUTPATIENT)
Dept: GENERAL RADIOLOGY | Age: 72
DRG: 871 | End: 2022-10-13
Payer: MEDICARE

## 2022-10-13 VITALS
HEART RATE: 99 BPM | TEMPERATURE: 97.5 F | RESPIRATION RATE: 16 BRPM | WEIGHT: 175 LBS | SYSTOLIC BLOOD PRESSURE: 128 MMHG | HEIGHT: 64 IN | OXYGEN SATURATION: 92 % | BODY MASS INDEX: 29.88 KG/M2 | DIASTOLIC BLOOD PRESSURE: 51 MMHG

## 2022-10-13 PROBLEM — J96.90 RESPIRATORY FAILURE (HCC): Status: RESOLVED | Noted: 2022-10-06 | Resolved: 2022-10-13

## 2022-10-13 LAB
CULTURE, BLOOD 2: ABNORMAL
CULTURE, BLOOD 2: ABNORMAL
GLUCOSE BLD-MCNC: 317 MG/DL (ref 70–99)
GLUCOSE BLD-MCNC: 322 MG/DL (ref 70–99)
GLUCOSE BLD-MCNC: 398 MG/DL (ref 70–99)
MISCELLANEOUS LAB TEST RESULT: NORMAL
ORGANISM: ABNORMAL
PERFORMED ON: ABNORMAL

## 2022-10-13 PROCEDURE — 6370000000 HC RX 637 (ALT 250 FOR IP): Performed by: HOSPITALIST

## 2022-10-13 PROCEDURE — 6360000002 HC RX W HCPCS: Performed by: INTERNAL MEDICINE

## 2022-10-13 PROCEDURE — 2580000003 HC RX 258: Performed by: HOSPITALIST

## 2022-10-13 PROCEDURE — 94761 N-INVAS EAR/PLS OXIMETRY MLT: CPT

## 2022-10-13 PROCEDURE — 94640 AIRWAY INHALATION TREATMENT: CPT

## 2022-10-13 PROCEDURE — 6370000000 HC RX 637 (ALT 250 FOR IP): Performed by: INTERNAL MEDICINE

## 2022-10-13 PROCEDURE — 2700000000 HC OXYGEN THERAPY PER DAY

## 2022-10-13 PROCEDURE — 6360000002 HC RX W HCPCS: Performed by: HOSPITALIST

## 2022-10-13 PROCEDURE — 82947 ASSAY GLUCOSE BLOOD QUANT: CPT

## 2022-10-13 PROCEDURE — 2580000003 HC RX 258: Performed by: INTERNAL MEDICINE

## 2022-10-13 PROCEDURE — 71046 X-RAY EXAM CHEST 2 VIEWS: CPT

## 2022-10-13 RX ADMIN — IPRATROPIUM BROMIDE AND ALBUTEROL SULFATE 1 AMPULE: 2.5; .5 SOLUTION RESPIRATORY (INHALATION) at 14:10

## 2022-10-13 RX ADMIN — SODIUM CHLORIDE, PRESERVATIVE FREE 10 ML: 5 INJECTION INTRAVENOUS at 09:08

## 2022-10-13 RX ADMIN — WATER 1000 MG: 1 INJECTION INTRAMUSCULAR; INTRAVENOUS; SUBCUTANEOUS at 12:47

## 2022-10-13 RX ADMIN — IPRATROPIUM BROMIDE AND ALBUTEROL SULFATE 1 AMPULE: 2.5; .5 SOLUTION RESPIRATORY (INHALATION) at 06:28

## 2022-10-13 RX ADMIN — SODIUM BICARBONATE 650 MG: 650 TABLET ORAL at 09:07

## 2022-10-13 RX ADMIN — NEPHROCAP 1 MG: 1 CAP ORAL at 09:07

## 2022-10-13 RX ADMIN — INSULIN LISPRO 6 UNITS: 100 INJECTION, SOLUTION INTRAVENOUS; SUBCUTANEOUS at 12:48

## 2022-10-13 RX ADMIN — INSULIN LISPRO 4 UNITS: 100 INJECTION, SOLUTION INTRAVENOUS; SUBCUTANEOUS at 20:22

## 2022-10-13 RX ADMIN — GUAIFENESIN 600 MG: 600 TABLET ORAL at 09:06

## 2022-10-13 RX ADMIN — ENOXAPARIN SODIUM 30 MG: 100 INJECTION SUBCUTANEOUS at 09:05

## 2022-10-13 RX ADMIN — SODIUM BICARBONATE 650 MG: 650 TABLET ORAL at 12:48

## 2022-10-13 RX ADMIN — INSULIN LISPRO 6 UNITS: 100 INJECTION, SOLUTION INTRAVENOUS; SUBCUTANEOUS at 09:35

## 2022-10-13 RX ADMIN — ACETYLCYSTEINE 600 MG: 200 SOLUTION ORAL; RESPIRATORY (INHALATION) at 06:28

## 2022-10-13 RX ADMIN — IPRATROPIUM BROMIDE AND ALBUTEROL SULFATE 1 AMPULE: 2.5; .5 SOLUTION RESPIRATORY (INHALATION) at 10:26

## 2022-10-13 NOTE — PLAN OF CARE
Problem: Discharge Planning  Goal: Discharge to home or other facility with appropriate resources  Outcome: Progressing  Flowsheets (Taken 10/12/2022 1956)  Discharge to home or other facility with appropriate resources: Identify barriers to discharge with patient and caregiver     Problem: Pain  Goal: Verbalizes/displays adequate comfort level or baseline comfort level  Outcome: Progressing     Problem: Safety - Adult  Goal: Free from fall injury  Outcome: Progressing     Problem: Chronic Conditions and Co-morbidities  Goal: Patient's chronic conditions and co-morbidity symptoms are monitored and maintained or improved  Outcome: Progressing  Flowsheets (Taken 10/12/2022 1956)  Care Plan - Patient's Chronic Conditions and Co-Morbidity Symptoms are Monitored and Maintained or Improved: Monitor and assess patient's chronic conditions and comorbid symptoms for stability, deterioration, or improvement

## 2022-10-13 NOTE — PROGRESS NOTES
Discussed with patient and spouse home medications and follow up appointments. Both expressed understanding. No further questions at this time. Patient discharging with spouse.  Electronically signed by Ceci Carrillo RN on 10/13/2022 at 6:25 PM

## 2022-10-13 NOTE — PROGRESS NOTES
Infectious Diseases Progress Note    Patient:  Galdino Vazquez  YOB: 1950  MRN: [id number]   Admit date: 10/6/2022   Admitting Physician: Betina Amaya DO  Primary Care Physician: No primary care provider on file. Chief Complaint/Interval History:  Came to see patient lat in day on 10/13. She indicated she had been discharged home. She indicated her  was on way to pick her up. She indicated she was feeling better and stable for discharge. Received phone from nursing a few hours later asking about antibiotic prescription. Nursing uncertain at that time as to any follow up arrangements made by discharging MD. From prior conversation with patient she had indicated she is from [place], but had been travelling with her  by boat on a grand loop which was to take them down the [place] and around to [facility]. Indicated to nursing that I felt she should be offered follow up locally within next few days prior to either returning home to [place] or prior to departing the area by boat. Told her I would be willing to see patient Monday AM at 10. They indicated her pharmacy was going to be [facility] and I phoned in a script for keflex for her. In/Out    Intake/Output Summary (Last 24 hours) at 10/13/2022 1641  Last data filed at 10/13/2022 1422  Gross per 24 hour   Intake 480 ml   Output --   Net 480 ml     Allergies:    Allergies   Allergen Reactions    Pcn [Penicillins]      Current Meds: b complex-C-folic acid (NEPHROCAPS) capsule 1 mg, Daily  epoetin ronaldo-epbx (RETACRIT) injection 10,000 Units, Weekly  sodium bicarbonate tablet 650 mg, 4x Daily  insulin lispro (HUMALOG) injection vial 0-8 Units, TID WC  insulin lispro (HUMALOG) injection vial 0-4 Units, Nightly  [Held by provider] insulin glargine (LANTUS) injection vial 10 Units, BID  polyethylene glycol (GLYCOLAX) packet 17 g, Daily  sennosides-docusate sodium (SENOKOT-S) 8.6-50 MG tablet 2 tablet, Daily  glucose chewable tablet 16 g, PRN  dextrose bolus 10% 125 mL, PRN   Or  dextrose bolus 10% 250 mL, PRN  glucagon (rDNA) injection 1 mg, PRN  dextrose 10 % infusion, Continuous PRN  sodium chloride flush 0.9 % injection 5-40 mL, 2 times per day  sodium chloride flush 0.9 % injection 10 mL, PRN  0.9 % sodium chloride infusion, PRN  enoxaparin Sodium (LOVENOX) injection 30 mg, Daily  ondansetron (ZOFRAN-ODT) disintegrating tablet 4 mg, Q8H PRN   Or  ondansetron (ZOFRAN) injection 4 mg, Q6H PRN  polyethylene glycol (GLYCOLAX) packet 17 g, Daily PRN  acetaminophen (TYLENOL) tablet 650 mg, Q6H PRN   Or  acetaminophen (TYLENOL) suppository 650 mg, Q6H PRN  ipratropium-albuterol (DUONEB) nebulizer solution 1 ampule, Q4H WA  acetylcysteine (MUCOMYST) 20 % solution 600 mg, BID  guaiFENesin (MUCINEX) extended release tablet 600 mg, BID      Review of Systems See HPI    VitalSigns:  BP (!) 128/51   Pulse 99   Temp 97.5 °F (36.4 °C)   Resp 16   Ht 5' 4\" (1.626 m)   Wt 175 lb (79.4 kg)   SpO2 92%   BMI 30.04 kg/m²      Physical Exam  Line/IV site: No erythema, warmth, induration, or tenderness. Pleasant, comfortable appearing, on RA at time of exam  Lungs without crackles or wheezes  Abdomen soft and non-tender  Looks tire overall but stable. Lab Results:  CBC:   Recent Labs     10/11/22  0300 10/12/22  0530   WBC 12.5* 11.4*   HGB 9.0* 9.2*   * 115*     BMP:  Recent Labs     10/11/22  0300 10/12/22  0530   * 144   K 4.1 4.0  4.0   * 113*   CO2 18* 19*   BUN 38* 35*   CREATININE 2.3* 2.0*   GLUCOSE 59* 146*     CultureResults:  Blood cultures October 7, 2022-no growth  Blood cultures October 7, 2022-Streptococcus allolyticus  Blood cultures October 9, 2022 no growth  Blood cultures October 9, 2022-no growth     Radiology: Per epic appears study was completed on October 9, 2022. Report available today:  Impression   Impression: 1. Mild bilateral hydroureter without discrete obstructive process seen.    Follow-up urogram could be considered to better assess. 2.Moderate right and small left pleural effusion with airspace disease at the   right lung base suggestive of pneumonia. 3.Other findings as noted above. Additional Studies Reviewed:    Conclusions  Summary  Normal left ventricular size with normal LV function and an estimated ejection fraction of approximately 70-75%. No regional wall  motion abnormalities. Normal left ventricular wall thickness. Grade II diastolic dysfunction with evidence for increased LVEDP. Normal right ventricular size with preserved RV function (TAPSE 28 mm). Normal bi-atrial size. Mild mitral valve stenosis (mean gradient 5 mmHg at  bpm). Mild tricuspid regurgitation with estimated RVSP of 43 mm Hg, suggestive for mild pulmonary hypertension. Aortic root and ascending aorta are within normal limits. No evidence of significant pericardial effusion is noted. The rhythm is sinus. Hemodynamic parameters: Normal SVI 40 ml/m2 and CI 3.9 L/min/m2 (average HR 98 bpm). No previous studies.   Signature  Electronically signed by Valeriano Barger(Interpreting physician) on 10/07/2022 09:00 AM    Impression:  Transient bacteremia - streptococcus species - suspect urinary source  DM  Nausea/vomiting - resolved  Acute on chronic renal insufficiency - improved    Recommendations:  Patient has been discharged  Suggest cephalexin 1000 mg po q 12 hours for 10 days - prescription phoned into [facility]  Had nursing offer her follow up with me on Monday at 4450 Dex Jean-Baptiste MD

## 2022-10-13 NOTE — CARE COORDINATION
10/13/22 1649   IMM Letter   IMM Letter given to Patient/Family/Significant other/Guardian/POA/by: Given to and explained to patient by Felix Luu RN CM. Patient verbalized understanding.    IMM Letter date given: 10/13/22   IMM Letter time given: 80

## 2022-10-13 NOTE — PROGRESS NOTES
At rest, patient O2 saturation at 94% on room air. While ambulating, patient O2 saturation  at 95% on room air.  Electronically signed by Ducle Mcgowan RN on 10/13/2022 at 1:44 PM

## 2022-10-13 NOTE — DISCHARGE SUMMARY
Discharge Summary    Kiah Mark  :  1950  MRN:  [id number]    Admit date:  10/6/2022  Discharge date: 10/13/2022     Admitting Physician:  Kp Conde MD    Advance Directive: Full Code    Consults: ID and nephrology    Primary Care Physician:  No primary care provider on file. Discharge Diagnoses:  Principal Problem (Resolved):    Respiratory failure ([address]  Active Problems:    * No active hospital problems. *      Significant Diagnostic Studies:   XR ABDOMEN (KUB) (SINGLE AP VIEW)    Result Date: 10/7/2022  Clinical history: Abdominal pain Finding: The bowel gas pattern is normal.There is fecal stasis suggesting of constipation. No evidence of organomegaly or abnormal calcifications is seen. The osseous structures of the abdomen and pelvis appear to be normal.There is mild atherosclerotic change of both iliac arteries with calcified plaque. There is a catheter seen in the pelvic region. .    Non-specific gas pattern. Fecal stasis suggesting of constipation    NM LUNG SCAN PERFUSION ONLY    Result Date: 10/12/2022  NO PRIOR REPORT AVAILABLE EXAM: NUCLEAR MEDICINE V/Q SCAN. CLINICAL DATA:Respiratory failure. TECHNIQUE: Nuclear medicine VQ scan was performed FIKY2.0 millicuries of technetium 99m MAA for the perfusion study. PRIOR STUDIES: No prior studies submitted FINDINGS: There is homogenousdistribution of radiotracer in both lungs on the perfusion images. No large wedge-shaped unmatched defects are identified. The study is low probability for pulmonary embolism. Please note according to the Piopped criteria a low probability reflects a 0 to 19%chance of a pulmonary embolism. Please correlate clinically. If symptoms persist, consider CT PA gram. RECOMMENDATION: Follow up as clinically indicated.  Electronically Signed by Damaris Billingsley MD at 12-Oct-2022 10:07:26 AM             XR CHEST PORTABLE    Result Date: 10/7/2022  Patient: Joeseph Gottron  Time Out: 02:24Exam(s): FILM CXR 1 VIEW EXAM:  XR Chest, 1 ViewCLINICAL HISTORY:   Reason for exam: central line placement. TECHNIQUE:  Frontal view of the chest.COMPARISON:  10/6/2022    Right central line in the upper SVCElectronically signed by Tahir Pete MD on 10-07-22 at 0224    XR CHEST PORTABLE    Result Date: 10/6/2022  Patient: Ralph Noguera  Time Out: 23:43Exam(s): FILM CXR 1 VIEW EXAM:  XR Chest, 1 ViewCLINICAL HISTORY:   Reason for exam: shortness of breath. TECHNIQUE:  Frontal view of the chest.COMPARISON:  No relevant prior studies available. FINDINGS:  Lungs:  Airspace opacities within the mid to lower lungs likely infectious. Pleural space:  Unremarkable. Heart:  Unremarkable. Mediastinum:  Unremarkable. Bones/joints:  Unremarkable. Airspace opacities within the mid to lower lungs likely infectious. Electronically signed by Phyllis Hernandez MD on 10-06-22 at 2343      CBC:   Recent Labs     10/11/22  0300 10/12/22  0530   WBC 12.5* 11.4*   HGB 9.0* 9.2*   * 115*     BMP:    Recent Labs     10/11/22  0300 10/12/22  0530   * 144   K 4.1 4.0  4.0   * 113*   CO2 18* 19*   BUN 38* 35*   CREATININE 2.3* 2.0*   GLUCOSE 59* 146*     INR: No results for input(s): INR in the last 72 hours. Lipids: No results for input(s): CHOL, HDL in the last 72 hours. Invalid input(s): LDLCALCU  ABGs:No results for input(s): PHART, JCB0IRM, PO2ART, LFA7UTS, BEART, HGBAE, Z5ZBTRJS, CARBOXHGBART, 02THERAPY in the last 72 hours. HgBA1c:  No results for input(s): LABA1C in the last 72 hours. Procedures: None  Hospital Course: Ms. Nany Shankar was admitted on 10/6 because of nausea vomiting and a clinical picture of sepsis. She was admitted to the ICU. Started on low-dose Levophed and broad-spectrum antibiotics according to the sepsis protocol. She did have evidence of renal dysfunction. Nephrology was consulted. She did have positive blood cultures, and we obtain consultation with infectious disease for additional antibiotic recommendations. It was felt to be urine as far as her source. She had clinical improvement and was transferred to the floor. She had some persistent hypoxia which did improve with her hospitalization. She had a VQ scan which was low probability for PE. On the afternoon of 10/13 she was stable for discharge. She lives in [place] and will be returning home to follow-up with her physicians there. Physical Exam:  Vital Signs: BP (!) 128/51   Pulse 99   Temp 97.5 °F (36.4 °C)   Resp 16   Ht 5' 4\" (1.626 m)   Wt 175 lb (79.4 kg)   SpO2 92%   BMI 30.04 kg/m²   General appearance:. Alert and Cooperative   HEENT: Normocephalic. Chest: clear to auscultation bilaterally without wheezes or rhonchi. Cardiac: Normal heart tones with regular rate and rhythm, S1, S2 normal. No murmurs, gallops, or rubs auscultated. Abdomen:soft, non-tender; normal bowel sounds, no masses, no organomegaly. Extremities: No clubbing or cyanosis. No peripheral edema. Peripheral pulses palpable. Neurologic: Grossly intact. Discharge Medications:         Medication List        CHANGE how you take these medications      aspirin 81 MG chewable tablet  What changed: Another medication with the same name was removed. Continue taking this medication, and follow the directions you see here. CONTINUE taking these medications      atorvastatin 20 MG tablet  Commonly known as: LIPITOR     insulin glargine 100 UNIT/ML injection vial  Commonly known as: LANTUS     NovoLOG 100 UNIT/ML injection vial  Generic drug: insulin aspart              Discharge Instructions: Follow up with PCP in 3-5 days. Take medications as directed. Resume activity as tolerated. Diet: ADULT DIET; Regular; 4 carb choices (60 gm/meal); Low Potassium (Less than 3000 mg/day)     Disposition: Patient is medically stable and will be discharged to home. Time spent on discharge 40 minutes.     Signed:  Michelle Patel DO

## 2022-10-13 NOTE — PROGRESS NOTES
Nephrology ([facility]) progress note      Patient:  Tera Allen  YOB: 1950  Date of Service: 10/13/2022  MRN: [id number]   Acct: [de-identified]   Primary Care Physician: No primary care provider on file. Advance Directive: Full Code  Admit Date: 10/6/2022       Hospital Day: 7  Referring Provider: Millie Sanders DO    Patient independently seen and examined, Chart, Consults, Notes, Operative notes, Labs, Cardiology, and Radiology studies reviewed as available. Subjective:  Tera Allen is a 70 y.o. female for whom we were consulted for evaluation and treatment of acute on chronic kidney disease. Patient recalls following with nephrologist in [facility] for chronic kidney disease. She is unable to quantify the GFR or creatinine. She was admitted for nausea, vomiting, cystitis. She denied hematuria, hemoptysis, rash, chest pain but has shortness of air at rest. She tolerated Lokelma without issue with subsequent improvement in potassium. Today, she is less dyspneic. She thinks she may be able to do without O2. She has good urine output. She worries for her BPs (although still within goal). Today, no overnight events.       Allergies:  Pcn [penicillins]    Medicines:  Current Facility-Administered Medications   Medication Dose Route Frequency Provider Last Rate Last Admin    b complex-C-folic acid (NEPHROCAPS) capsule 1 mg  1 capsule Oral Daily Марина Sanchez MD   1 mg at 10/13/22 3919    epoetin ronaldo-epbx (RETACRIT) injection 10,000 Units  10,000 Units SubCUTAneous Weekly Марина Sanchez MD   10,000 Units at 10/11/22 1601    sodium bicarbonate tablet 650 mg  650 mg Oral 4x Daily Gayle Holder MD   650 mg at 10/13/22 0907    insulin lispro (HUMALOG) injection vial 0-8 Units  0-8 Units SubCUTAneous TID  Gayle Holder MD   6 Units at 10/13/22 0935    insulin lispro (HUMALOG) injection vial 0-4 Units  0-4 Units SubCUTAneous Nightly Marleni Eason MD   4 Units at 10/12/22 2044    cefTRIAXone (ROCEPHIN) 1,000 mg in sterile water 10 mL IV syringe  1,000 mg IntraVENous Q24H Jimena Syed MD   1,000 mg at 10/12/22 1242    vancomycin (VANCOCIN) intermittent dosing (placeholder)   Other Sandro Herbert MD        [Held by provider] insulin glargine (LANTUS) injection vial 10 Units  10 Units SubCUTAneous BID Marleni Eason MD   10 Units at 10/11/22 0844    polyethylene glycol (GLYCOLAX) packet 17 g  17 g Oral Daily Marleni Eason MD   17 g at 10/12/22 0831    sennosides-docusate sodium (SENOKOT-S) 8.6-50 MG tablet 2 tablet  2 tablet Oral Daily Marleni Eason MD   2 tablet at 10/12/22 0831    glucose chewable tablet 16 g  4 tablet Oral PRN Fortunato Silva MD        dextrose bolus 10% 125 mL  125 mL IntraVENous PRN Fortunato Silva MD        Or    dextrose bolus 10% 250 mL  250 mL IntraVENous PRN Fortunato Silva MD        glucagon (rDNA) injection 1 mg  1 mg SubCUTAneous PRN Fortunato Silva MD        dextrose 10 % infusion   IntraVENous Continuous PRN Fortunato Silva MD        sodium chloride flush 0.9 % injection 5-40 mL  5-40 mL IntraVENous 2 times per day Marleni Eason MD   10 mL at 10/13/22 0908    sodium chloride flush 0.9 % injection 10 mL  10 mL IntraVENous PRN Marleni Eason MD        0.9 % sodium chloride infusion   IntraVENous PRN Marleni Eason MD        enoxaparin Sodium (LOVENOX) injection 30 mg  30 mg SubCUTAneous Daily Marleni Eason MD   30 mg at 10/13/22 0905    ondansetron (ZOFRAN-ODT) disintegrating tablet 4 mg  4 mg Oral Q8H PRN Marleni Eason MD        Or    ondansetron [facility]) injection 4 mg  4 mg IntraVENous Q6H PRN Marleni Eason MD        polyethylene glycol (GLYCOLAX) packet 17 g  17 g Oral Daily PRN Marleni Eason MD        acetaminophen (TYLENOL) tablet 650 mg  650 mg Oral Q6H PRN Marleni Eason MD Or    acetaminophen (TYLENOL) suppository 650 mg  650 mg Rectal Q6H PRN Ilene Santiago MD        ipratropium-albuterol (DUONEB) nebulizer solution 1 ampule  1 ampule Inhalation Q4H WA Ilene Santiago MD   1 ampule at 10/13/22 1026    acetylcysteine (MUCOMYST) 20 % solution 600 mg  600 mg Inhalation BID Ilene Santiago MD   600 mg at 10/13/22 3433    guaiFENesin [facility]) extended release tablet 600 mg  600 mg Oral BID Ilene Santiago MD   600 mg at 10/13/22 8677       Past Medical History:  Past Medical History:   Diagnosis Date    Chronic kidney disease     stage 4 not receiving dialysis    Diabetes mellitus ([facility] [address]     type 1 dx 1963    Gallstones     Hypertension     Lactose intolerance     Osteopenia     UTI (urinary tract infection)     in ICU in April with UTI       Past Surgical History:  Past Surgical History:   Procedure Laterality Date    CARPAL TUNNEL RELEASE Bilateral     CATARACT REMOVAL      FRACTURE SURGERY         Family History  History reviewed. No pertinent family history.     Social History  Social History     Socioeconomic History    Marital status:      Spouse name: Not on file    Number of children: Not on file    Years of education: Not on file    Highest education level: Not on file   Occupational History    Not on file   Tobacco Use    Smoking status: Never    Smokeless tobacco: Never   Substance and Sexual Activity    Alcohol use: Never    Drug use: Never    Sexual activity: Not on file   Other Topics Concern    Not on file   Social History Narrative    Not on file     Social Determinants of Health     Financial Resource Strain: Not on file   Food Insecurity: Not on file   Transportation Needs: Not on file   Physical Activity: Not on file   Stress: Not on file   Social Connections: Not on file   Intimate Partner Violence: Not on file   Housing Stability: Not on file         Review of Systems:  History obtained from chart review and the patient  General ROS: No fever or chills  Respiratory ROS: No cough, +shortness of breath, +wheezing  Cardiovascular ROS: No chest pain or palpitations  Gastrointestinal ROS: No abdominal pain or melena  Genito-Urinary ROS: No dysuria, no hematuria  Musculoskeletal ROS: No joint pain or swelling   14 point ROS reviewed with the patient and negative except as noted above and in the HPI unless unable to obtain. Objective:  Patient Vitals for the past 24 hrs:   BP Temp Pulse Resp SpO2 Height   10/13/22 1026 -- -- -- -- 100 % --   10/13/22 0629 -- -- -- -- 97 % --   10/13/22 0557 -- -- -- -- -- 5' 4\" (1.626 m)   10/13/22 0411 (!) 128/51 97.5 °F (36.4 °C) 99 16 91 % --   10/12/22 1921 (!) 145/58 97.5 °F (36.4 °C) 97 -- 100 % --   10/12/22 1908 -- -- -- -- 97 % --   10/12/22 1644 135/71 97.5 °F (36.4 °C) 97 20 -- --   10/12/22 1430 -- -- 91 20 93 % --         Intake/Output Summary (Last 24 hours) at 10/13/2022 1121  Last data filed at 10/12/2022 1530  Gross per 24 hour   Intake 240 ml   Output 900 ml   Net -660 ml       General: awake/alert   Chest:  Scattered fin erales  CVS: regular rate and rhythm  Abdominal: soft, nontender, normal bowel sounds  Extremities: no cyanosis but trace leg edema  Skin: warm and dry without rash      Labs:  BMP:   Recent Labs     10/11/22  0300 10/12/22  0530   * 144   K 4.1 4.0  4.0   * 113*   CO2 18* 19*   BUN 38* 35*   CREATININE 2.3* 2.0*   CALCIUM 8.4* 8.7*       CBC:   Recent Labs     10/11/22  0300 10/12/22  0530   WBC 12.5* 11.4*   HGB 9.0* 9.2*   HCT 28.4* 29.3*   .9* 102.8*   * 115*       LIVER PROFILE:   No results for input(s): AST, ALT, LIPASE, BILIDIR, BILITOT, ALKPHOS in the last 72 hours. Invalid input(s): AMYLASE,  ALB    PT/INR: No results for input(s): PROTIME, INR in the last 72 hours. APTT: No results for input(s): APTT in the last 72 hours. BNP:  No results for input(s): BNP in the last 72 hours.   Ionized Calcium:No results for input(s): IONCA in the last 72 hours. Magnesium:  Recent Labs     10/11/22  0300 10/12/22  0530   MG 1.8 1.7       Phosphorus:  No results for input(s): PHOS in the last 72 hours. HgbA1C: No results for input(s): LABA1C in the last 72 hours. Hepatic:   No results for input(s): ALKPHOS, ALT, AST, PROT, BILITOT, BILIDIR, LABALBU in the last 72 hours. Lactic Acid:   No results for input(s): LACTA in the last 72 hours. Troponin: No results for input(s): CKTOTAL, CKMB, TROPONINT in the last 72 hours. ABGs: No results for input(s): PH, PCO2, PO2, HCO3, O2SAT in the last 72 hours. CRP:  No results for input(s): CRP in the last 72 hours. Sed Rate:  No results for input(s): SEDRATE in the last 72 hours. Cultures:   No results for input(s): CULTURE in the last 72 hours. No results for input(s): BC, Lelan Salts in the last 72 hours. No results for input(s): CXSURG in the last 72 hours. Radiology reports as per the Radiologist  Radiology: XR ABDOMEN (KUB) (SINGLE AP VIEW)    Result Date: 10/7/2022  Clinical history: Abdominal pain Finding: The bowel gas pattern is normal.There is fecal stasis suggesting of constipation. No evidence of organomegaly or abnormal calcifications is seen. The osseous structures of the abdomen and pelvis appear to be normal.There is mild atherosclerotic change of both iliac arteries with calcified plaque. There is a catheter seen in the pelvic region. .    Non-specific gas pattern. Fecal stasis suggesting of constipation    XR CHEST PORTABLE    Result Date: 10/7/2022  Patient: Elvi Stacey  Time Out: 02:24Exam(s): FILM CXR 1 VIEW EXAM:  XR Chest, 1 ViewCLINICAL HISTORY:   Reason for exam: central line placement. TECHNIQUE:  Frontal view of the chest.COMPARISON:  10/6/2022    Right central line in the upper SVCElectronically signed by Dee Pineda MD on 10-07-22 at 0224    XR CHEST PORTABLE    Result Date: 10/6/2022  Patient: Elvi Ibarra  Time Out: 23:43Exam(s): FILM CXR 1 VIEW EXAM:  XR Chest, 1 ViewCLINICAL HISTORY:   Reason for exam: shortness of breath. TECHNIQUE:  Frontal view of the chest.COMPARISON:  No relevant prior studies available. FINDINGS:  Lungs:  Airspace opacities within the mid to lower lungs likely infectious. Pleural space:  Unremarkable. Heart:  Unremarkable. Mediastinum:  Unremarkable. Bones/joints:  Unremarkable. Airspace opacities within the mid to lower lungs likely infectious. Electronically signed by Madison Franklin MD on 10-06-22 at 2343    ECHO 2D [address]    Result Date: 10/7/2022  Transthoracic Echocardiography Report (TTE)  Demographics   Patient Name   Jeana Miller  Date of Study           10/07/2022   MRN            [id number]        Gender                  Female   Date of Birth  1950    Room Number             [id number]   Age            70 year(s)   Height:        64 inches     Referring Physician     Gayle Holder   Weight:        155 pounds    Sonographer             Yesenia Milan   BSA:           1.76 m^2      Interpreting Physician  Andreia Camara   BMI:           26.61 kg/m^2  Procedure Type of Study   TTE procedure:ECHO 2D W/DOPPLER/COLOR/CONTRAST. Study Location: Portable Technical Quality: Limited visualization due to poor acoustical window. Patient Status: Inpatient Contrast Medium: Definity. HR: 98 bpm BP: 74/52 mmHg Indications:Dyspnea/SOB. Conclusions   Summary  Normal left ventricular size with normal LV function and an estimated  ejection fraction of approximately 70-75%. No regional wall motion  abnormalities. Normal left ventricular wall thickness. Grade II diastolic  dysfunction with evidence for increased LVEDP. Normal right ventricular size with preserved RV function (TAPSE 28 mm). Normal bi-atrial size. Mild mitral valve stenosis (mean gradient 5 mmHg at  bpm). Mild tricuspid regurgitation with estimated RVSP of 43 mm Hg, suggestive  for mild pulmonary hypertension.   Aortic root and ascending aorta are within normal limits. No evidence of significant pericardial effusion is noted. The rhythm is sinus. Hemodynamic parameters: Normal SVI 40 ml/m2 and CI 3.9 L/min/m2 (average  HR 98 bpm). No previous studies. Signature   ----------------------------------------------------------------  Electronically signed by Kacie Barger(Interpreting physician)  on 10/07/2022 09:00 AM  ----------------------------------------------------------------   Findings   Mitral Valve  Mildly sclerosed posterior annulus and leaflet of the mitral valve with  mildly reduced leaflet mobility. Mild mitral valve stenosis (mean gradient  5 mmHg at  bpm). Trivial mitral regurgitation. Aortic Valve  Aortic valve appears to be tricuspid. Structurally normal aortic valve. No significant aortic regurgitation or stenosis is noted. Tricuspid Valve  Tricuspid valve is structurally normal.  Mild tricuspid regurgitation with estimated RVSP of 43 mm Hg, suggestive  for mild pulmonary hypertension. Pulmonic Valve  The pulmonic valve was not well visualized. No evidence of pulmonic stenosis. No evidence of pulmonic regurgitation. Left Atrium  Normal size left atrium. Left Ventricle  Normal left ventricular size with normal LV function and an estimated  ejection fraction of approximately 70-75%. No regional wall motion abnormalities. Normal left ventricular wall thickness. No evidence of left ventricular mass or thrombus noted. Grade II diastolic dysfunction with evidence for increased LVEDP. Right Atrium  Normal right atrial dimension with no evidence of thrombus or mass noted. Right Ventricle  Normal right ventricular size with preserved RV function (TAPSE 28 mm). Pericardial Effusion  No evidence of significant pericardial effusion is noted. Pleural Effusion  No evidence of pleural effusion. Miscellaneous  Aortic root and ascending aorta are within normal limits.   The IVC is normal.  Doppler of the pulmonary veins shows normal flow. Definity contrast was utilized to enhance the endocardial borders. The rhythm is sinus. Hemodynamic parameters: SVI 40 ml/m2 and CI 3.9 L/min/m2 (average HR 98  bpm). 2D Measurements and Calculations(cm)   LVIDd: 4.55 cm                      LVIDs: 3.28 cm  IVSd: 0.98 cm  LVPWd: 0.97 cm                      AO Root Dimension: 2.2 cm  Rt. Vent.  Dimension: 2.44 cm        LA Dimension: 3.1 cm  % Ejection Fraction: 55 %           LA Area: 12.1 cm^2  LA Volume: 22.5 ml                  LV Systolic dimension: 1.79CP  LA Volume Index: 13 ml/m^2          LV PW diastolic: 8.57NV  LV dimension: 4.55 cm               LVOT diameter: 2 cm                                      RA Systolic pressure: 3mmHg  LVEDV: 85.1 ml                      RV Diastolic dimension: 2.64GY  LVESV:25.8 ml                       LVEDVI: 48 ml/m^2  Cardic Output (CO): 6.83l/min       LVESVI: 15 ml/m^2  Ascending Aorta: 2.5 cm  Doppler Measurements and Calculations   AV Peak Velocity:196 cm/s              MV Peak E-Wave: 104 cm/s  AV Mean Velocity:130 cm/s              MV Peak A-Wave: 115 cm/s  AV Peak Gradient: 15.37 mmHg           MV E/A Ratio: 0.9 %  AV Mean Gradient: 8 mmHg               MV Mean velocity: 105cm/s  AV Area (Continuity):1.83 cm^2         MV Peak Gradient: 4.33 mmHg  AV VTI:38 cm/s                         MV Mean gradient: 5 mmHg  TR Velocity:297 cm/s                   MV P1/2t: 74 msec  TR Gradient:35.28 mmHg                 MVA by PHT2.97 cm^2  Estimated RAP:8 mmHg  RVSP:43 mmHg   MV E' septal velocity: 5.77cm/s  MV E' lateral velocity:8.38 cm/s         Assessment   -Acute kidney injury-ATN  -Chronic kidney disease stage IV   -Hyperkalemia  -Acute cystitis to E faecium  -Sepsis  -Metabolic acidosis  -Diabetes type 1 with renal disease  -Hypernatremia  -Acute hypoxemic resp failure  -Anemia of CKD  -Elevated vanco trough  -Pneumonia      Plan:  Discussed with patient  Work-up reviewed to date  Monitor labs and BPs  Potassium better   Continue oral alkali   Antibiotics per ID (Vanco)  Encouraged oral water intake  MVI and Retiglesiait    Yasmani Mcwilliams MD  10/13/22  11:21 AM

## 2022-10-13 NOTE — PROGRESS NOTES
Patient placed in reverse trendelenburg, instructed on valsalva maneuver. Two stitches removed along with CVC dressing. While patient bore down, catheter was removed. Vaseline gauze and dry gauze placed over site. Pressure held for 10 minutes. Patient instructed to remain supine for 30 minutes until 1630. Large Tegaderm placed over insertion site. No oozing or bleeding noted.  Electronically signed by Dulce Mcgowan RN on 10/13/2022 at 4:02 PM

## 2022-10-14 LAB
BLOOD CULTURE, ROUTINE: NORMAL
CULTURE, BLOOD 2: NORMAL

## 2022-10-14 NOTE — PROGRESS NOTES
Dr. Vidhya Null contacted this nurse to inform patient to be on Keflex 1000mg PO x 10 days post discharge. Patient requests prescription to be called into [facility]. Dr. Kristin Daniels to call in. New discharge instructions printed and provided. Patient and  agreeable to see Dr. Kristin Daniels on Monday for a follow up. Patient provided with office phone number and instructed to call tomorrow.  Electronically signed by Jose D Springer RN on 10/13/2022 at 8:06 PM

## 2022-10-14 NOTE — DISCHARGE INSTRUCTIONS
Call Dr. Akash Johnson office on 10/14/22 at [phone] for follow up appointment on Monday 10/17/22. Take Keflex 1000 mg orally for 10 days. May  prescription from Jose Hammer at [facility].

## 2022-10-14 NOTE — PROGRESS NOTES
Upon presentation of new discharge papers, patients requests for blood sugar to be taken. Blood sugar 398. Administered 4 units per sliding scale. Patient and  do not wish to stay for further treatment and wish to discharge at this time. Expresses understanding of implications of higher glucose level and how to treat at home.  Electronically signed by Gabriela Kumar RN on 10/13/2022 at 8:31 PM